# Patient Record
Sex: FEMALE | Race: WHITE | NOT HISPANIC OR LATINO | Employment: STUDENT | URBAN - METROPOLITAN AREA
[De-identification: names, ages, dates, MRNs, and addresses within clinical notes are randomized per-mention and may not be internally consistent; named-entity substitution may affect disease eponyms.]

---
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
## 2024-01-02 DIAGNOSIS — F90.0 ATTENTION DEFICIT HYPERACTIVITY DISORDER (ADHD), PREDOMINANTLY INATTENTIVE TYPE: ICD-10-CM

## 2024-01-02 RX ORDER — DEXMETHYLPHENIDATE HYDROCHLORIDE 10 MG/1
10 CAPSULE, EXTENDED RELEASE ORAL DAILY
Qty: 30 CAPSULE | Refills: 0 | Status: SHIPPED | OUTPATIENT
Start: 2024-01-02

## 2024-01-02 RX ORDER — DEXMETHYLPHENIDATE HYDROCHLORIDE 10 MG/1
10 CAPSULE, EXTENDED RELEASE ORAL DAILY
Qty: 30 CAPSULE | Refills: 0 | Status: CANCELLED | OUTPATIENT
Start: 2024-01-02

## 2024-01-02 NOTE — TELEPHONE ENCOUNTER
Chart and PMDP reviewed. Refill was sent to the patient's preferred pharmacy, covering patient's primary psychiatric provider. The patient has an upcoming appointment with SARI Carlson on 1/12/24.

## 2024-01-02 NOTE — TELEPHONE ENCOUNTER
Medication Refill Request     Name of Medication focalin xr  Dose/Frequency 10mg take 1 capsule by mouth daily  Quantity 30  Verified pharmacy   [x]  Verified ordering Provider   [x]  Does patient have enough for the next 3 days? Yes [x] No []  Does patient have a follow-up appointment scheduled? Yes [x] No []   If so when is appointment: 1/12/2024 9am

## 2024-01-05 ENCOUNTER — TELEPHONE (OUTPATIENT)
Dept: PSYCHIATRY | Facility: CLINIC | Age: 19
End: 2024-01-05

## 2024-01-05 NOTE — TELEPHONE ENCOUNTER
Mother of patient LVM to move patients new patient appointment to a later time in the day at 4 or later due to conflict or reschedule.     Writer called and LVM with office number for mother to call back.

## 2024-01-08 NOTE — TELEPHONE ENCOUNTER
Patient is calling regarding cancelling an new pt appointment.    Date/Time: 1/12/24 at 9 am    Reason: patient had work    Patient was rescheduled: YES [x] NO []  If yes, when was Patient reschedule for: 1/25/24 at 2:30 pm    Patient is calling regarding cancelling an appointment.    Date/Time: 4 week f/u appt rescheduled on 2/7 at 3 pm     Reason: pt r/s new pt appt    Patient was rescheduled: YES [x] NO []  If yes, when was Patient reschedule for: 2/22 at 3:30 pm    Patient requesting call back to reschedule: YES [] NO [x]

## 2024-01-17 DIAGNOSIS — F41.1 GAD (GENERALIZED ANXIETY DISORDER): ICD-10-CM

## 2024-01-19 PROBLEM — R51.9 ACUTE INTRACTABLE HEADACHE: Status: ACTIVE | Noted: 2024-01-19

## 2024-01-19 NOTE — ASSESSMENT & PLAN NOTE
In the setting of URI symptoms. Since headache is most bothersome will trial Toradol IM and follow up if not improved. Treat URI symptomatically otherwise. Follow up if not improved.

## 2024-01-25 ENCOUNTER — OFFICE VISIT (OUTPATIENT)
Dept: PSYCHIATRY | Facility: CLINIC | Age: 19
End: 2024-01-25
Payer: COMMERCIAL

## 2024-01-25 DIAGNOSIS — F90.0 ATTENTION DEFICIT HYPERACTIVITY DISORDER (ADHD), PREDOMINANTLY INATTENTIVE TYPE: Primary | ICD-10-CM

## 2024-01-25 DIAGNOSIS — F41.1 GAD (GENERALIZED ANXIETY DISORDER): ICD-10-CM

## 2024-01-25 PROCEDURE — 90792 PSYCH DIAG EVAL W/MED SRVCS: CPT | Performed by: NURSE PRACTITIONER

## 2024-01-25 RX ORDER — ESCITALOPRAM OXALATE 20 MG/1
20 TABLET ORAL DAILY
Qty: 90 TABLET | Refills: 1 | Status: SHIPPED | OUTPATIENT
Start: 2024-01-25

## 2024-01-25 RX ORDER — ESCITALOPRAM OXALATE 20 MG/1
20 TABLET ORAL DAILY
Qty: 90 TABLET | Refills: 1 | OUTPATIENT
Start: 2024-01-25

## 2024-01-25 RX ORDER — DEXMETHYLPHENIDATE HYDROCHLORIDE 15 MG/1
15 CAPSULE, EXTENDED RELEASE ORAL DAILY
Qty: 30 CAPSULE | Refills: 0 | Status: SHIPPED | OUTPATIENT
Start: 2024-01-25

## 2024-01-25 NOTE — PSYCH
PSYCHIATRIC EVALUATION     [facility] - PSYCHIATRIC ASSOCI[facility]    Name and Date of Birth:  Kathleen Guy 18 y.o. 2005 MRN: [id number]    Date of Visit: January 25, 2024    Reason for visit: Full psychiatric intake assessment for medication management        Chief Complaint   Patient presents with    Medication Management    Establish Care    ADHD    Anxiety       HPI:     Kathleen Guy is a 18 y.o.  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  Kathleen was a former patient of SARI Albert (last visit on 9/8/23) on Lexapro 20 mg daily, Focalin XR 10 mg daily, Intuniv 2 mg at bedtime, Atarax 25 mg twice daily as needed.  Tolerating medication well with no medication side effects observed or reported.  Not actively involved in individual psychotherapy.      Kathleen Guy was visited in the clinic; chart reviewed.  Patient presents to today's appointment as a transfer of care from SARI Otero with an established diagnosis of ADHD, combined type and generalized anxiety disorder.  Patient presents with her mother, Teresa Solomon, who provides collateral information.  During the appointment, Kathleen often looks to her mother for reassurance when answering questions.  She is tangential and has difficulty maintaining focus.  Body is restless, observed fidgeting with hands, changing positions often, and balancing legs up and down.  She becomes more comfortable with the interview with encouragement from this writer as the interview progresses.    The patient reports first meeting with psychiatrist in fifth grade due to symptoms of ADHD and started on  "medications.  She also reports pathologic anxiety starting in childhood which was particularly focused on events occurring in school (difficulty with friendships, teachers, and academics).  Symptoms included worry about \"everything and anything\", irritability, muscle tension, and excessive nervousness.  Reports that panic is symptoms were prevalent prior to the start of Lexapro, but have not occurred since that time.  Currently, she reports ADHD symptoms are interfering with her performance both academically and professionally.  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  While at school, she finds her mind wandering often while in class.  She is unable to recollect whether current dose of Focalin has been effective at alleviating the symptoms as she has not gone any days without the medication.  Reports that dose was not increased during her last session due to concerns of increased anxiety with increased dose.  However, it is apparent that the patient is experiencing significant struggle with ADHD symptoms during time of encounter.  Discussed dose optimization of Focalin to 15 mg daily.  Currently, her appetite is baseline with no change in weight over the last several months.  Energy level is baseline and appropriate.  She denies insomnia.  Typically goes to bed around 10 PM and wakes at 9 AM averaging 11 hours of sleep each night.  Wakes refreshed.  She enjoys painting, large puzzles, photography.  Denies anhedonia, hopelessness, helplessness, worthlessness, or guilt.  No thought constriction related to death.  Denies SI/HI, intent or plan upon direct inquiry at this time.  PHQ-9 score: 11.    Currently endorses occasional and appropriate anxiety that is not pathologic in nature with use of Lexapro. Denies excessive nervousness, irrational worry, or overt anxiousness. Not pervasively tense, \"keyed up\", or chronically on-edge.  No evidence " "that Kathleen experiences irritability, inability to relax, or disruption in sleep secondary to baseline, non-pathologic anxiety. Denies new-onset panic symptomatology or maladaptive behaviors. Throughout today's session, Kathleen does not appear visibly unsettled.  JUDSON-7 score: 6.    She endorses symptoms both acute and chronic consistent with diagnosis of ADHD, combined type.  She often makes careless mistakes and has poor attention, has difficulty sustaining attention, does not listen when spoken to directly, has poor follow-through on tasks, demonstrates organizational challenges, loses important items, is easily distracted, and forgetful in daily activities.  Additionally, she has difficulty staying seated, often appears to be \"driven by a motor\", is restless and fidgety, and at times talks excessively.  These symptoms interfere significantly in academic and professional environments.    Denies history of trauma with no intrusive, avoidance, negative alterations, or hyperarousal symptoms of PTSD noted. Denied any history of disordered eating.  No symptoms of OCD including obsessive, ritualistic acts, intrusive thoughts or images noted. No current or history of manic symptoms. She does not exhibit objective evidence of xander/hypomania.  Not currently irritable, grandiose, labile, or pathologically euphoric.  No objective evidence of swapnil psychosis.  She is without perceptual disturbances, such as A/V hallucinations, paranoia, ideas of reference, or delusional beliefs.  Does not appear preoccupied at time of encounter.  Denies recent ETOH or illicit substance abuse.      Review Of Systems:    Constitutional negative   ENT negative   Cardiovascular negative   Respiratory negative   Gastrointestinal negative   Genitourinary negative   Musculoskeletal negative   Integumentary negative   Neurological negative   Endocrine negative   Other Symptoms none, all other systems are negative         PHQ-2/9 Depression Screening  "   Little interest or pleasure in doing things: 1 - several days  Feeling down, depressed, or hopeless: 1 - several days  Trouble falling or staying asleep, or sleeping too much: 0 - not at all  Feeling tired or having little energy: 2 - more than half the days  Poor appetite or overeatin - several days  Feeling bad about yourself - or that you are a failure or have let yourself or your family down: 1 - several days  Trouble concentrating on things, such as reading the newspaper or watching television: 2 - more than half the days  Moving or speaking so slowly that other people could have noticed. Or the opposite - being so fidgety or restless that you have been moving around a lot more than usual: 3 - nearly every day  Thoughts that you would be better off dead, or of hurting yourself in some way: 0 - not at all  PHQ-9 Score: 11  PHQ-9 Interpretation: Moderate depression         JUDSON-7 Flowsheet Screening      Flowsheet Row Most Recent Value   Over the last 2 weeks, how often have you been bothered by any of the following problems?    Feeling nervous, anxious, or on edge 2   Not being able to stop or control worrying 1   Worrying too much about different things 1   Trouble relaxing 0   Being so restless that it is hard to sit still 0   Becoming easily annoyed or irritable 1   Feeling afraid as if something awful might happen 1   JUDSON-7 Total Score 6              Past Psychiatric History:  Italicized information copied from Leda Gloria's CRNP note 23.  New information bolded.     General Information: denies formal diagnosis or past psychiatric history, denies past psychiatric hospitalizations, denies past suicide attempts, no h/o self-injurious behaviors, no h/o physical aggression     Past Medication Trials: Tenex (ineffective), Adderall (emotional and tearful, appetite suppression and insomnia), Strattera 25 mg (limited benefit, ineffective, more depressed), Concerta up to 36 mg daily (ineffective).   Focalin (effective), Intuniv (effective)     Therapist/Counseling Services: never been in therapy  Past psychiatric providers: Dr. Hamilton, Serena MÉNDEZ, Billie GUO     Family Psychiatric History:   Paternal side - depression and bipolar, anxiety     FH of Suicide - paternal cousin    Substance Abuse History:     Tobacco/alcohol/caffeine: Denies alcohol use, Denies tobacco use, Denies caffeine use  Illicit drugs: Denies history of illicit drug use    No past legal actions or arrests secondary to substance intoxication. The patient denies prior DWIs/DUIs. No history of outpatient/inpatient rehabilitation programs. Kathleen does not exhibit objective evidence of substance withdrawal during today's examination nor does Kathleen appear under the influence of any psychoactive substance.        Social History:      Developmental: Denies a history of milestone/developmental delay. Denies a history of in-utero exposure to toxins/illicit substances. IEP since 1st grade.  Education: some college, PT student at [employer]  Marital history: single  Children: none  Living arrangement, social support: parents  Occupational History: employed at [employer] as [employer]  Access to firearms: Denies direct access to weapons/firearms. Kathleen Guy has no history of arrests or violence with a deadly weapon.     Traumatic History:      Abuse: none  Other Traumatic Events: none     Family Psychiatric History:     Family History   Problem Relation Age of Onset    No Known Problems Mother     No Known Problems Father     Cancer Maternal Grandmother     Cancer Maternal Grandfather     Diabetes Maternal Grandfather     Hypertension Maternal Grandfather     Thyroid disease Maternal Grandfather     Other Maternal Grandfather         Thyroid Disorder    Dementia Maternal Grandfather     Hypertension Paternal Grandfather     Heart disease Paternal Grandfather     Osteoporosis Paternal Grandfather     Stroke Paternal Grandfather     Coronary artery  disease Paternal Grandfather     Migraines Neg Hx     Learning disabilities Neg Hx          Past Medical History:    Past Medical History:   Diagnosis Date    CASTAÑEDA (dyspnea on exertion)     Psychiatric disorder     Tinea versicolor         Past Surgical History:   Procedure Laterality Date    DENTAL SURGERY  2017    several baby teeth pulled     No Known Allergies    History Review:    The following portions of the patient's history were reviewed and updated as appropriate:allergies, current medications, past family history, past medical history, past social history, past surgical history and problem list.    OBJECTIVE:    Vital signs in last 24 hours:    There were no vitals filed for this visit.    Mental Status Evaluation:  Appearance and attitude: appeared as stated age, cooperative and attentive, casually dressed, with good hygiene  Eye contact: fair  Motor Function: PMA  Gait/station: normal gait/station and normal balance  Speech: hyper-talkative  Language: No overt abnormality  Mood/affect: euthymic / Affect was constricted but reactive, mood congruent  Thought Processes: tangential, increased rate of thoughts  Thought content: denies suicidal ideation or homicidal ideation; no delusions or first rank symptoms  Associations: tangential associations  Perceptual disturbances: denies Auditory/Visual/Tactile Hallucinations  Orientation: oriented to time, person, place and to the situational context  Cognitive Function: intact  Memory: recent and remote memory grossly intact  Intellect: h/o learning disability  Fund of knowledge: aware of current events, aware of past history, and vocabulary average  Impulse control: impulsive at times  Insight/judgment: fair/fair    Pain: denied    Lab Results: I have personally reviewed all pertinent laboratory/tests results  Most Recent Labs:   Lab Results   Component Value Date    WBC 5.37 10/28/2022    RBC 4.74 10/28/2022    HGB 14.4 10/28/2022    HCT 41.7 10/28/2022    PLT  276 10/28/2022    RDW 11.7 10/28/2022    NEUTROABS 2.47 10/28/2022    SODIUM 137 10/28/2022    K 3.6 10/28/2022     10/28/2022    CO2 26 10/28/2022    BUN 15 10/28/2022    CREATININE 0.83 10/28/2022    CALCIUM 10.0 10/28/2022    AST 17 10/28/2022    ALT 24 10/28/2022    ALKPHOS 72 10/28/2022    TP 8.4 10/28/2022    TBILI 0.31 10/28/2022     EKG   Lab Results   Component Value Date    VENTRATE 87 10/28/2022    ATRIALRATE 87 10/28/2022    PRINT 188 10/28/2022    QRSDINT 90 10/28/2022    QTINT 376 10/28/2022    PAXIS 70 10/28/2022    QRSAXIS 83 10/28/2022    TWAVEAXIS 72 10/28/2022       Suicide/Homicide Risk Assessment:    Risk of Harm to Self:  The following ratings are based on assessment at the time of the interview  Demographic risk factors include: , age: young adult (15-24)  Historical Risk Factors include: history of anxiety  Recent Specific Risk Factors include: none  Protective Factors: no current suicidal ideation, ability to adapt to change, able to manage anger well, access to mental health treatment, compliant with medications, compliant with mental health treatment, connection to community, contact with caregivers, cultural beliefs discouraging suicide, effective coping skills, effective decision-making skills, effective problem solving skills, good health, good self-esteem, having a desire to be alive, having a desire to live, having a sense of purpose or meaning in life, healthy fear of risky behaviors and pain, medical compliance, no substance use problems, opportunities to contribute to community, opportunities to participate in community, personal beliefs, personal beliefs about the meaning and value of life, resiliency, restricted access to lethal means, stable living environment, stable job, sense of determination, sense of importance of health and wellness, sense of personal control, strong relationships, supportive family  Based on today's assessment, Kathleen presents the following  risk of harm to self: low    Risk of Harm to Others:  The following ratings are based on assessment at the time of the interview  Demographic Risk Factors include: none.  Historical Risk Factors include: none.  Recent Specific Risk Factors include: none.  Protective Factors: no current homicidal ideation  Based on today's assessment, Kathleen presents the following risk of harm to others: none    The following interventions are recommended: no intervention changes needed. Although patient's acute lethality risk is LOW, long-term/chronic lethality risk is mildly elevated given history of anxiety. However, at the current moment, Kathleen is future-oriented, forward-thinking, and demonstrates ability to act in a self-preserving manner as evidenced by volitionally presenting to the clinic today, seeking treatment.  At this time, inpatient hospitalization is not currently warranted. To mitigate future risk, patient should adhere to treatment recommendations, avoid alcohol/illicit substance use, utilize community-based resources and familiar support, and prioritize mental health treatment.     Based on today's assessment and clinical criteria, Kathleen Guy contracts for safety and is not an imminent risk of harm to self or others. Outpatient level of care is deemed appropriate at this present time. Kathleen understands that if they are no longer able to contract for safety, they need to call/contact the outpatient office including this writer, call/contact crisis and/or attend to the nearest Emergency Department for immediate evaluation.    Assessment/Plan:     In summary, Kathleen Guy is a 18 y.o.  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  Kathleen was a former patient of SARI Albert (last visit on 9/8/23) on Lexapro 20 mg daily, Focalin XR 10 mg daily, Intuniv 2 mg at bedtime, Atarax 25 mg twice daily as needed.  Tolerating medication well with no medication side effects observed or reported.  Not actively involved in individual psychotherapy.  The patient reports first meeting with psychiatrist in fifth grade due to symptoms of ADHD and started on medications.  She also reports pathologic anxiety starting in childhood which was particularly focused on events occurring in school (difficulty with friendships, teachers, and academics).  Reports that panic is symptoms were prevalent prior to the start of Lexapro, but have not occurred since that time.  Currently, she reports ADHD symptoms are interfering with her performance both academically and professionally.  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  While at school, she finds her mind wandering often while in class.  She is unable to recollect whether current dose of Focalin has been effective at alleviating the symptoms as she has not gone any days without the medication.  Reports that dose was not increased during her last session due to concerns of increased anxiety with increased dose.  However, it is apparent that the patient is experiencing significant struggle with ADHD symptoms during time of encounter.  Discussed dose optimization of Focalin to 15 mg daily.  Currently, her appetite is baseline with no change in weight over the last several months.  Energy level is baseline and appropriate.  She denies insomnia.  Typically goes to bed around 10 PM and wakes at 9 AM averaging 11 hours of sleep each night.  Wakes refreshed.  She enjoys painting, large puzzles, photography.  Denies anhedonia, hopelessness, helplessness, worthlessness, or guilt.  No thought  constriction related to death.  Denies SI/HI, intent or plan upon direct inquiry at this time.  PHQ-9 score: 11; JUDSON-7 score: 6.  Her current presentation meets criteria for ADHD, combined type, JUDSON.  Currently she is not at risk for suicide, homicide, self-injury, aggressive behaviors, self-neglect, or neglect of dependents or children. Given this presentation, the patient will benefit from further outpatient follow up for management of her symptoms.     Psychopharmacologically, patient endorses mood stability on current medication regimen.  However, ADHD symptoms remain problematic both with her academics and employment.  In the past, dose optimization was not done secondary to fear of increased anxiety.  However patient's anxiety is well-controlled at this time.  Will optimize Focalin to 15 mg daily.         Plan:  Increase Focalin to 15 mg daily for ADHD  Continue Lexapro 20 mg daily for anxiety  Psychotherapy-declines at this time  Follow up with primary care provider for ongoing medical care  Follow up with this provider in 1 month         Diagnoses and all orders for this visit:    Attention deficit hyperactivity disorder (ADHD), predominantly inattentive type  -     dexmethylphenidate (Focalin XR) 15 MG 24 hr capsule; Take 1 capsule (15 mg total) by mouth daily Max Daily Amount: 15 mg    JUDSON (generalized anxiety disorder)  -     escitalopram (LEXAPRO) 20 mg tablet; Take 1 tablet (20 mg total) by mouth daily            - Psychoeducation provided regarding the importance of exercise and health dietary choices and their impact on mood, energy, and motivation.  - Counseled to avoid ETOH, illicit substances, and nicotine secondary to the detrimental effects of these substances on mental and physical health.  - Psychoeducation regarding medication benefits and risks, side effects, indications and alternatives provided to the patient and the importance of compliance with psychiatric medication reiterated. The  Kathleenhannah Guy verbalized understanding and agreed with the plan  - Educated on the Bio-Psycho-Social Spiritual and Environmental Approach to mental health.  - The patient was educated about 24 hour and weekend coverage for urgent situations accessed by calling [facility] main practice number  - Patient was educated to call [employer] Suicide Prevention Lifeline (2-663-834-TALK [9896]) for behavioral crisis at any time, 911 for any safety concerns, or go to nearest ER if her symptoms become overwhelming or unmanageable.    Medications Risks/Benefits:      Risks, Benefits And Possible Side Effects Of Medications:    Risks, benefits, and possible side effects of medications explained to Kathleen including risk of suicidality and serotonin syndrome related to treatment with antidepressants and risks of cardiovascular side effects including elevated blood pressure, risk of misuse, abuse or dependence and risk of increased anxiety related to treatment with stimulant medications. She verbalizes understanding and agreement for treatment.    Controlled Medication Discussion:     Kathleen has been filling controlled prescriptions on time as prescribed according to [place] Prescription Drug Monitoring Program    Treatment Plan:    Completed and signed during the session: Not applicable - Treatment Plan not due at this session    Visit Time    Visit Start Time: 2:30 PM  Visit Stop Time: 3:30 PM  Total Visit Duration:  60 minutes    SARI Carlson 01/25/24    This note was completed in part utilizing [employer]. Grammatical, translation, syntax errors, random word insertions, spelling mistakes, and incomplete sentences may be an occasional consequence of this system secondary to software limitations with voice recognition, ambient noise, and hardware issues. If you have any questions or concerns about the content, text, or information contained within the body of this dictation,  please contact the provider for clarification.

## 2024-01-30 ENCOUNTER — OFFICE VISIT (OUTPATIENT)
Dept: FAMILY MEDICINE CLINIC | Facility: CLINIC | Age: 19
End: 2024-01-30
Payer: COMMERCIAL

## 2024-01-30 VITALS
HEIGHT: 68 IN | DIASTOLIC BLOOD PRESSURE: 60 MMHG | HEART RATE: 76 BPM | RESPIRATION RATE: 16 BRPM | BODY MASS INDEX: 20.61 KG/M2 | OXYGEN SATURATION: 98 % | SYSTOLIC BLOOD PRESSURE: 98 MMHG | TEMPERATURE: 98 F | WEIGHT: 136 LBS

## 2024-01-30 DIAGNOSIS — R10.13 EPIGASTRIC PAIN: Primary | ICD-10-CM

## 2024-01-30 PROCEDURE — 99213 OFFICE O/P EST LOW 20 MIN: CPT | Performed by: FAMILY MEDICINE

## 2024-01-30 NOTE — ASSESSMENT & PLAN NOTE
Recurrent epigastric pain, worse recently. Could be due to viral etiology vs reflux. Will keep food and symptom diary. Can treat with antacids and Pepcid prn. Follow up if persistent or worsening.

## 2024-01-30 NOTE — PROGRESS NOTES
"Name: aKthleen Guy      : 2005      MRN: [id number]  Encounter Provider: Arabella Aguilar DO  Encounter Date: 2024   Encounter department: [facility]    Assessment & Plan     1. Epigastric pain  Assessment & Plan:  Recurrent epigastric pain, worse recently. Could be due to viral etiology vs reflux. Will keep food and symptom diary. Can treat with antacids and Pepcid prn. Follow up if persistent or worsening.             Subjective      HPI  Reflux symptoms for months, feels like it is worsening recently. Tried antacids which did not improve her symptoms, though the severe symptoms have resolved.     Review of Systems    Current Outpatient Medications on File Prior to Visit   Medication Sig   • dexmethylphenidate (Focalin XR) 15 MG 24 hr capsule Take 1 capsule (15 mg total) by mouth daily Max Daily Amount: 15 mg   • escitalopram (LEXAPRO) 20 mg tablet Take 1 tablet (20 mg total) by mouth daily   • guanFACINE HCl ER (INTUNIV) 2 MG TB24 Take 1 tablet (2 mg total) by mouth daily at bedtime   • hydrOXYzine HCL (ATARAX) 25 mg tablet Take 1 tablet (25 mg total) by mouth 2 (two) times a day as needed for anxiety   • Multiple Vitamin (MULTI-VITAMIN DAILY PO) Take by mouth   • Sodium Fluoride 5000 PPM 1.1 % PSTE Use as directed   • benzonatate (TESSALON PERLES) 100 mg capsule Take 1 capsule (100 mg total) by mouth 3 (three) times a day as needed for cough (Patient not taking: Reported on 2024)   • SF 5000 Plus 1.1 % CREA Use as directed (Patient not taking: Reported on 2023)       Objective     BP 98/60 (BP Location: Left arm, Patient Position: Sitting, Cuff Size: Standard)   Pulse 76   Temp 98 °F (36.7 °C) (Temporal)   Resp 16   Ht 5' 8\" (1.727 m)   Wt 61.7 kg (136 lb)   SpO2 98%   BMI 20.68 kg/m²     Physical Exam  Vitals reviewed.   Constitutional:       Appearance: Normal appearance.   Cardiovascular:      Rate and Rhythm: Normal rate.   Pulmonary:      Effort: Pulmonary " effort is normal.   Abdominal:      General: Abdomen is flat. There is no distension.      Palpations: Abdomen is soft.      Tenderness: There is no abdominal tenderness.   Skin:     General: Skin is warm and dry.   Neurological:      Mental Status: She is alert.   Psychiatric:         Mood and Affect: Mood normal.         Behavior: Behavior normal.       Arabella Aguilar DO

## 2024-02-09 ENCOUNTER — TELEPHONE (OUTPATIENT)
Dept: PSYCHIATRY | Facility: CLINIC | Age: 19
End: 2024-02-09

## 2024-02-09 DIAGNOSIS — F41.1 GAD (GENERALIZED ANXIETY DISORDER): ICD-10-CM

## 2024-02-09 DIAGNOSIS — F90.0 ATTENTION DEFICIT HYPERACTIVITY DISORDER (ADHD), PREDOMINANTLY INATTENTIVE TYPE: ICD-10-CM

## 2024-02-09 NOTE — TELEPHONE ENCOUNTER
Faxed request from pharmacy, [facility], for a 90 day refill of escitalopram 20 mg and guanfacine ER 2 mg tabs    Next appointment 2/22/24

## 2024-02-12 RX ORDER — ESCITALOPRAM OXALATE 20 MG/1
20 TABLET ORAL DAILY
Qty: 90 TABLET | Refills: 1 | Status: SHIPPED | OUTPATIENT
Start: 2024-02-12

## 2024-02-12 RX ORDER — GUANFACINE 2 MG/1
2 TABLET, EXTENDED RELEASE ORAL
Qty: 90 TABLET | Refills: 1 | Status: SHIPPED | OUTPATIENT
Start: 2024-02-12

## 2024-02-12 NOTE — TELEPHONE ENCOUNTER
Dixon for Kathleen- Received a faxed refill request for 2 of her medications to be sent to  [facility]. Calling to verify if would like medications sent to [facility]. Encouraged a call back. Provided nursing number

## 2024-02-12 NOTE — TELEPHONE ENCOUNTER
Patients parent called and returned call to confirm on patents behalf medications to be sent to [facility]    Medication Refill Request     Name of Medication guanfacine 2 mg tablet  Dose/Frequency 2 mg tablet/take 1 tablet by mouth daily  Quantity 2 mg tablet  Verified pharmacy   [x]  Verified ordering Provider   [x]  Does patient have enough for the next 3 days? Yes [x] No []  Does patient have a follow-up appointment scheduled? Yes [x] No []   If so when is appointment: 2/22    Medication Refill Request     Name of Medication lexapro 20 mg tablet  Dose/Frequency 20 mg tablet/take 1 daily  Quantity 20 mg tablet  Verified pharmacy   [x]  Verified ordering Provider   [x]  Does patient have enough for the next 3 days? Yes [x] No []  Does patient have a follow-up appointment scheduled? Yes [x] No []   If so when is appointment: 2/22

## 2024-02-21 NOTE — PSYCH
MEDICATION MANAGEMENT NOTE        [facility]      Name and Date of Birth:  Kathleen Guy 18 y.o. 2005 MRN: [id number]    Date of Visit: February 22, 2024    Reason for Visit:   Chief Complaint   Patient presents with    Medication Management    Follow-up    Anxiety    ADHD         SUBJECTIVE:    Kathleen Guy is a 18 y.o. female with past psychiatric history significant for Generalized Anxiety Disorder and ADHD who was personally seen and evaluated today at the [facility] outpatient clinic for follow-up and medication management. Completes psychiatric assessment without difficulty.     Kathleen endorses compliance with psychotropic medication regimen that consists of Lexapro and Focalin.  At previous outpatient psychiatric appointment with this writer, Focalin increased to 15 mg daily.   She denies any current adverse medication side effects.      Overall, Kathleen  endorses improvement in ability to direct attention and focus with academics but continues to struggle at work as commotion is ever present. No issues with discipline at work.  Continues to enjoy her job. Discussed strategies to manage ADHD symptoms as medications may not alleviate all symptoms.  Sleeping well.  Energy good. Not depressed. Having issues with heart burn.  Has an upcoming appointment GI for evaluation.  Stomach issues have contributed to anxiety.  Also appears to have difficulty in social situations at work in which she is uncertain of how she is being viewed or evaluate.  Discussed cognitive distortions and the role of individual psychotherapy in overall mental health stability. Patient was receptive to information and agreeable to be placed on therapy wait list.  No other questions/concerns.       Current Rating Scores:     None completed today.    Past Psychiatric History: (unchanged information from previous note copied and italicized) - Information that is  bolded has been updated.     General Information: denies formal diagnosis or past psychiatric history, denies past psychiatric hospitalizations, denies past suicide attempts, no h/o self-injurious behaviors, no h/o physical aggression     Past Medication Trials: Tenex (ineffective), Adderall (emotional and tearful, appetite suppression and insomnia), Strattera 25 mg (limited benefit, ineffective, more depressed), Concerta up to 36 mg daily (ineffective).  Focalin (effective), Intuniv (effective)     Therapist/Counseling Services: never been in therapy  Past psychiatric providers: Dr. Hamilton, Serena Jordan PA, Billie GUO     Family Psychiatric History:   Paternal side - depression and bipolar, anxiety     FH of Suicide - paternal cousin    Substance Abuse History: (unchanged information from previous note copied and italicized) - Information that is bolded has been updated.     Tobacco/alcohol/caffeine: Denies alcohol use, Denies tobacco use, Denies caffeine use  Illicit drugs: Denies history of illicit drug use     No past legal actions or arrests secondary to substance intoxication. The patient denies prior DWIs/DUIs. No history of outpatient/inpatient rehabilitation programs. Kathleen does not exhibit objective evidence of substance withdrawal during today's examination nor does Kathleen appear under the influence of any psychoactive substance.      Social History: (unchanged information from previous note copied and italicized) - Information that is bolded has been updated.     Developmental: Denies a history of milestone/developmental delay. Denies a history of in-utero exposure to toxins/illicit substances. IEP since 1st grade.  Education: some college, PT student at 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 Kathleen Guy has no history of  arrests or violence with a deadly weapon.     Traumatic History: (unchanged information from previous note copied and italicized) - Information that is bolded has been updated.     Abuse: none  Other Traumatic Events: none       Past Medical History:    Past Medical History:   Diagnosis Date    CASTAÑEDA (dyspnea on exertion)     Psychiatric disorder     Tinea versicolor         Past Surgical History:   Procedure Laterality Date    DENTAL SURGERY  2017    several baby teeth pulled    WISDOM TOOTH EXTRACTION       No Known Allergies    Substance Abuse History:    Social History     Substance and Sexual Activity   Alcohol Use No     Social History     Substance and Sexual Activity   Drug Use No       Social History:    Social History     Socioeconomic History    Marital status: Single     Spouse name: Not on file    Number of children: Not on file    Years of education: Not on file    Highest education level: Not on file   Occupational History    Not on file   Tobacco Use    Smoking status: Never    Smokeless tobacco: Never   Vaping Use    Vaping status: Never Used   Substance and Sexual Activity    Alcohol use: No    Drug use: No    Sexual activity: Not on file   Other Topics Concern    Not on file   Social History Narrative    Lives with Mom & Dad- no siblings    Graduated [employer].        Finished 1 year at [employer].    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        Pets/Animals: yes birds 4    /After School Program:yes 12 th grade In person    Carbon Monoxide/Smoke detectors in home: yes    Fire Place: no    Exposure to Mold: yes basement is not finished    Carpet in Home: yes    Stuffed Animals (Toys): yes sleeps with stuffed animals     Tobacco Use: Exposure to smoke no    E-Cigarette/Vaping: Exposure to E-Cigarette/Vaping no             Social Determinants of Health     Financial Resource Strain: Not on file   Food Insecurity: Not on file   Transportation Needs: Not on file   Physical  Activity: Not on file   Stress: Not on file   Social Connections: Not on file   Intimate Partner Violence: Not on file   Housing Stability: Not on file       Family Psychiatric History:     Family History   Problem Relation Age of Onset    No Known Problems Mother     No Known Problems Father     Cancer Maternal Grandmother     Cancer Maternal Grandfather     Diabetes Maternal Grandfather     Hypertension Maternal Grandfather     Thyroid disease Maternal Grandfather     Other Maternal Grandfather         Thyroid Disorder    Dementia Maternal Grandfather     Hypertension Paternal Grandfather     Heart disease Paternal Grandfather     Osteoporosis Paternal Grandfather     Stroke Paternal Grandfather     Coronary artery disease Paternal Grandfather     Migraines Neg Hx     Learning disabilities Neg Hx        History Review: The following portions of the patient's history were reviewed and updated as appropriate: allergies, current medications, past medical history, and past social history.         OBJECTIVE:     Vital signs in last 24 hours:    There were no vitals filed for this visit.    Mental Status Evaluation:    Appearance age appropriate, casually dressed   Behavior cooperative, fair eye contact, restless and fidgety   Speech normal rate, normal volume, normal pitch   Mood anxious   Affect constricted   Thought Processes organized, goal directed   Associations intact associations   Thought Content no overt delusions   Perceptual Disturbances: no auditory hallucinations, no visual hallucinations   Abnormal Thoughts  Risk Potential Suicidal ideation - None  Homicidal ideation - None  Potential for aggression - No   Orientation oriented to: person, place, time/date, and situation   Memory recent and remote memory grossly intact   Consciousness alert and awake   Attention Span Concentration Span attention span and concentration are age appropriate   Intellect appears to be of average intelligence   Insight intact    Judgement intact   Muscle Strength and  Gait normal muscle strength and normal muscle tone, normal gait and normal balance   Motor activity no abnormal movements   Language no difficulty naming common objects, no difficulty repeating a phrase   Fund of Knowledge adequate knowledge of current events  adequate fund of knowledge regarding past history  adequate fund of knowledge regarding vocabulary    Pain none   Pain Scale 0       Laboratory Results: I have personally reviewed all pertinent laboratory/tests results    Lethality Statement:    Based on today's assessment and clinical criteria, Kathleen Guy contracts for safety and is not an imminent risk of harm to self or others. Outpatient level of care is deemed appropriate at this current time. Kathleen understands that if they can no longer contract for safety, they need to call the office or report to their nearest Emergency Room for immediate evaluation.    Assessment/Plan:     In summary, Kathleen Guy is a 18 y.o.  female, Single (never ), domiciled with parents, currently employed ([employer], bagger) and PT student at [employer], w/ no significant PMH and PPH of JUDSON, ADHD, no prior psychiatric admissions, no prior SA, no h/o self-injurious behavior,  who presented to the mental health clinic for the initial intake and psychiatric evaluation on January 25, 2024.  Kathleen was a former patient of SARI Albert (last visit on 9/8/23) on Lexapro 20 mg daily, Focalin XR 10 mg daily, Intuniv 2 mg at bedtime, Atarax 25 mg twice daily as needed.  Tolerating medication well with no medication side effects observed or reported.  Not actively involved in individual psychotherapy.  The patient reports first meeting with psychiatrist in fifth grade due to symptoms of ADHD and started on medications.  She also reports pathologic anxiety starting in childhood which was particularly focused on events occurring in school (difficulty with friendships,  teachers, and academics).  Reports that panic is symptoms were prevalent prior to the start of Lexapro, but have not occurred since that time.  Currently, she reports ADHD symptoms are interfering with her performance both academically and professionally.  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  While at school, she finds her mind wandering often while in class.  She is unable to recollect whether current dose of Focalin has been effective at alleviating the symptoms as she has not gone any days without the medication.  Reports that dose was not increased during her last session due to concerns of increased anxiety with increased dose.  However, it is apparent that the patient is experiencing significant struggle with ADHD symptoms during time of encounter.  Discussed dose optimization of Focalin to 15 mg daily.  Currently, her appetite is baseline with no change in weight over the last several months.  Energy level is baseline and appropriate.  She denies insomnia.  Typically goes to bed around 10 PM and wakes at 9 AM averaging 11 hours of sleep each night.  Wakes refreshed.  She enjoys painting, large puzzles, photography.  Denies anhedonia, hopelessness, helplessness, worthlessness, or guilt.  No thought constriction related to death.  Denies SI/HI, intent or plan upon direct inquiry at this time.  PHQ-9 score: 11; JUDSON-7 score: 6.  Her current presentation meets criteria for ADHD, combined type, JUDSON.  Focalin increased to 15 mg daily secondary to problematic ADHD symptoms affecting both employment and academics.    Psychopharmacologically, Kathleen endorses an improvement in ADHD symptoms with increased dose of Focalin.  She is experiencing heightened anxiety due to GI issues in addition to social situations.  Will place on therapy wait list. No medication changes. Will consider Buspar at next session if appropriate.      Risks/benefits/alternativies to treatment discussed, including a myriad of potential adverse medication side effects, to which Kathleen voiced understanding and consented fully to treatment.  Also, patient is amenable to calling/contacting the outpatient office including this writer if any acute adverse effects of their medication regimen arise in addition to any comments or concerns pertaining to their psychiatric management.         Plan:  Continue Focalin to 15 mg daily for ADHD  Continue Lexapro 20 mg daily for anxiety  Psychotherapy- placed on therapy wait list.   Follow up with primary care provider for ongoing medical care  Follow up with this provider in 3 months     Diagnoses and all orders for this visit:    Social anxiety disorder    Attention deficit hyperactivity disorder (ADHD), predominantly inattentive type           - Psychoeducation provided regarding the importance of exercise and health dietary choices and their impact on mood, energy, and motivation.  - Counseled to avoid ETOH, illicit substances, and nicotine secondary to the detrimental effects of these substances on mental and physical health.  - Encouraged to engage in non-verbal forms of therapy such as art therapy, music therapy, and mindfulness.   Aware of 24 hour and weekend coverage for urgent situations accessed by calling [facility] main practice number    Medications Risks/Benefits      Risks, Benefits And Possible Side Effects Of Medications:    Risks, benefits, and possible side effects of medications explained to Kathleen including risk of suicidality and serotonin syndrome related to treatment with antidepressants and risks of cardiovascular side effects including elevated blood pressure, risk of misuse, abuse or dependence and risk of increased anxiety related to treatment with stimulant medications. She verbalizes understanding and agreement for treatment.    Controlled Medication Discussion:     Kathleen  has been filling controlled prescriptions on time as prescribed according to [place] Prescription Drug Monitoring Program    Psychotherapy Provided:     Individual psychotherapy provided: Yes  Counseling was provided during the session today for 16 minutes  Medication education provided to Kathleen  Goals discussed during session  Cognitive therapy was utilized during the session  Reassurance and supportive therapy provided     Treatment Plan:    Completed and signed during the session: Not applicable - Treatment Plan not due at this session    Visit Time    Visit Start Time: 3:40 PM  Visit Stop Time: 4:20 PM  Total Visit Duration:  40 minutes    SARI Carlson 02/22/24    This note was completed in part utilizing [employer]. Grammatical, translation, syntax errors, random word insertions, spelling mistakes, and incomplete sentences may be an occasional consequence of this system secondary to software limitations with voice recognition, ambient noise, and hardware issues. If you have any questions or concerns about the content, text, or information contained within the body of this dictation, please contact the provider for clarification.

## 2024-02-22 ENCOUNTER — TELEPHONE (OUTPATIENT)
Dept: PSYCHIATRY | Facility: CLINIC | Age: 19
End: 2024-02-22

## 2024-02-22 ENCOUNTER — OFFICE VISIT (OUTPATIENT)
Dept: PSYCHIATRY | Facility: CLINIC | Age: 19
End: 2024-02-22

## 2024-02-22 DIAGNOSIS — F40.10 SOCIAL ANXIETY DISORDER: Primary | Chronic | ICD-10-CM

## 2024-02-22 DIAGNOSIS — F90.0 ATTENTION DEFICIT HYPERACTIVITY DISORDER (ADHD), PREDOMINANTLY INATTENTIVE TYPE: ICD-10-CM

## 2024-02-22 NOTE — TELEPHONE ENCOUNTER
Patient called as pt will be a few mins late and to let provider know. Writer sent a message to provider so she is aware.

## 2024-02-23 ENCOUNTER — TELEPHONE (OUTPATIENT)
Dept: GASTROENTEROLOGY | Facility: CLINIC | Age: 19
End: 2024-02-23

## 2024-02-23 ENCOUNTER — CONSULT (OUTPATIENT)
Dept: GASTROENTEROLOGY | Facility: CLINIC | Age: 19
End: 2024-02-23
Payer: COMMERCIAL

## 2024-02-23 VITALS
HEIGHT: 68 IN | TEMPERATURE: 98.5 F | DIASTOLIC BLOOD PRESSURE: 70 MMHG | WEIGHT: 134 LBS | BODY MASS INDEX: 20.31 KG/M2 | SYSTOLIC BLOOD PRESSURE: 106 MMHG

## 2024-02-23 DIAGNOSIS — K59.09 CHRONIC CONSTIPATION: Primary | ICD-10-CM

## 2024-02-23 DIAGNOSIS — K21.9 GASTROESOPHAGEAL REFLUX DISEASE WITHOUT ESOPHAGITIS: ICD-10-CM

## 2024-02-23 DIAGNOSIS — R10.13 EPIGASTRIC PAIN: ICD-10-CM

## 2024-02-23 PROCEDURE — 99204 OFFICE O/P NEW MOD 45 MIN: CPT | Performed by: INTERNAL MEDICINE

## 2024-02-23 RX ORDER — POLYETHYLENE GLYCOL 3350 17 G/17G
17 POWDER, FOR SOLUTION ORAL DAILY
Qty: 510 G | Refills: 5 | Status: SHIPPED | OUTPATIENT
Start: 2024-02-23 | End: 2024-08-21

## 2024-02-23 NOTE — PROGRESS NOTES
[facility] - Outpatient Consultation  Kathleen Guy 18 y.o. female MRN: [id number]  Encounter: [id number]          ASSESSMENT AND PLAN:      1. Chronic constipation  2.  GERD  3.  Abdominal pain    Patient symptoms worsening in the last few weeks.  Differentials include upper GI symptoms uncontrolled due to uncontrolled constipation, peptic ulcer disease, H. pylori, gastritis.  At this time we will start off by treating constipation.  Discussed with patient about starting MiraLAX once daily.  Increase hydration to 8 to 10 cups of water per day.  The goal of therapy is to have 1-2 soft loose bowel movements per day with feeling of complete evacuation.  She can increase the dose of MiraLAX up to 3 times a day if needed to achieve the goal of bowel movements.  Which ever dose of MiraLAX helps achieve the goal of bowel movements she can take that on a daily basis.  Discussed with patient about getting EGD done to evaluate for other causes.  Patient agreeable.  Procedure ordered.  Continue Pepcid for now.    RTC 4 months.    Kalpesh Hennessy MD  Gastroenterology  [facility]  Date: February 23, 2024    - polyethylene glycol (MIRALAX) 17 g packet; Take 17 g by mouth daily  Dispense: 510 g; Refill: 5  - EGD; Future    ______________________________________________________________________    HPI: 18-year-old with social anxiety disorder presents for evaluation.    Patient was seen by ENT earlier this month patient had laryngoscopy done during the visit.  The records from the visit were reviewed by me today.  There was evidence of mild laryngeal pharyngeal reflux during the laryngoscopy.  Patient was started on omeprazole 20 mg daily.    Patient reports epigastric abdominal pain for the last 3 to 4 weeks.  This is increasing in severity and frequency.  She also has heartburn for the last few weeks usually occurs after eating.  She tried Pepcid which has not completely  helped symptoms.  She started omeprazole after the ENT visit but only could take it for 2 days as it worsened her abdominal pain and then she stopped it.  Patient reports hard bowel movements for the last few months.  She notes that as [place] stool scale type II.  No blood in stools.  Currently does not take any stool softeners.  She does take fiber Gummies.  She has noted weight gain in the last 1 year.  Her grandmother had colon cancer.  She reports Advil intake.  No alcohol or marijuana intake.  No cigarette smoking.    Patient had labs done in October 2022 which were reviewed by me today and showed normal renal function, liver enzymes and blood counts.  TSH done in 2020 was normal.  No abdominal imaging was available to review for me in patient records.      REVIEW OF SYSTEMS:    CONSTITUTIONAL: Denies any fever, chills, rigors, and weight loss.  HEENT: No earache or tinnitus. Denies hearing loss or visual disturbances.  CARDIOVASCULAR: No chest pain or palpitations.   RESPIRATORY: Denies any cough, hemoptysis, shortness of breath or dyspnea on exertion.  GASTROINTESTINAL: As noted in the History of Present Illness.   GENITOURINARY: No problems with urination. Denies any hematuria or dysuria.  NEUROLOGIC: No dizziness or vertigo, denies headaches.   MUSCULOSKELETAL: Denies any muscle or joint pain.   SKIN: Denies skin rashes or itching.   ENDOCRINE: Denies excessive thirst. Denies intolerance to heat or cold.  PSYCHOSOCIAL: Denies depression or anxiety. Denies any recent memory loss.       Historical Information   Past Medical History:   Diagnosis Date    CASTAÑEDA (dyspnea on exertion)     Psychiatric disorder     Tinea versicolor      Past Surgical History:   Procedure Laterality Date    DENTAL SURGERY  2017    several baby teeth pulled    WISDOM TOOTH EXTRACTION       Social History   Social History     Substance and Sexual Activity   Alcohol Use No     Social History     Substance and Sexual Activity   Drug Use  "No     Social History     Tobacco Use   Smoking Status Never   Smokeless Tobacco Never     Family History   Problem Relation Age of Onset    No Known Problems Mother     No Known Problems Father     Cancer Maternal Grandmother     Colon cancer Maternal Grandmother     Cancer Maternal Grandfather     Diabetes Maternal Grandfather     Hypertension Maternal Grandfather     Thyroid disease Maternal Grandfather     Other Maternal Grandfather         Thyroid Disorder    Dementia Maternal Grandfather     Hypertension Paternal Grandfather     Heart disease Paternal Grandfather     Osteoporosis Paternal Grandfather     Stroke Paternal Grandfather     Coronary artery disease Paternal Grandfather     Migraines Neg Hx     Learning disabilities Neg Hx        Meds/Allergies       Current Outpatient Medications:     dexmethylphenidate (Focalin XR) 15 MG 24 hr capsule    escitalopram (LEXAPRO) 20 mg tablet    guanFACINE HCl ER (INTUNIV) 2 MG TB24    hydrOXYzine HCL (ATARAX) 25 mg tablet    Multiple Vitamin (MULTI-VITAMIN DAILY PO)    polyethylene glycol (MIRALAX) 17 g packet    Sodium Fluoride 5000 PPM 1.1 % PSTE    benzonatate (TESSALON PERLES) 100 mg capsule    No Known Allergies        Objective     Blood pressure 106/70, temperature 98.5 °F (36.9 °C), height 5' 8\" (1.727 m), weight 60.8 kg (134 lb). Body mass index is 20.37 kg/m².        PHYSICAL EXAM:      General Appearance:   Alert, cooperative, no distress   HEENT:   Normocephalic, atraumatic, anicteric.     Neck:  Supple, symmetrical, trachea midline   Lungs:   Clear to auscultation bilaterally; no rales, rhonchi or wheezing; respirations unlabored    Heart::   Regular rate and rhythm; no murmur, rub, or gallop.   Abdomen:   Soft, non-tender, non-distended; normal bowel sounds; no masses, no organomegaly    Genitalia:   Deferred    Rectal:   Deferred    Extremities:  No cyanosis, clubbing or edema    Pulses:  2+ and symmetric    Skin:  No jaundice, rashes, or lesions  "   Lymph nodes:  No palpable cervical lymphadenopathy        Lab Results:   No visits with results within 1 Day(s) from this visit.   Latest known visit with results is:   Office Visit on 12/28/2023   Component Date Value    POCT SARS-CoV-2 Ag 12/28/2023 Negative     VALID CONTROL 12/28/2023 Valid          Radiology Results:   No results found.

## 2024-02-23 NOTE — PATIENT INSTRUCTIONS
Scheduled date of EGD(as of today):4/11/24  Physician performing EGD: Dr. Hennessy  [facility] of EGD[facility]  Instructions reviewed with patient by: Amanda  Clearances: N/A

## 2024-02-26 DIAGNOSIS — F90.0 ATTENTION DEFICIT HYPERACTIVITY DISORDER (ADHD), PREDOMINANTLY INATTENTIVE TYPE: ICD-10-CM

## 2024-02-26 RX ORDER — DEXMETHYLPHENIDATE HYDROCHLORIDE 15 MG/1
15 CAPSULE, EXTENDED RELEASE ORAL DAILY
Qty: 30 CAPSULE | Refills: 0 | Status: SHIPPED | OUTPATIENT
Start: 2024-02-26 | End: 2024-03-01 | Stop reason: SDUPTHER

## 2024-02-26 NOTE — TELEPHONE ENCOUNTER
Medication Refill Request     Name of Medication focalin XR 15 mg 24 hr capsule  Dose/Frequency take 1 capsule daily  Quantity 15 mg capsule  Verified pharmacy   [x]  Verified ordering Provider   [x]  Does patient have enough for the next 3 days? Yes [x] No []  Does patient have a follow-up appointment scheduled? Yes [x] No []   If so when is appointment: 5/30

## 2024-02-27 ENCOUNTER — NURSE TRIAGE (OUTPATIENT)
Age: 19
End: 2024-02-27

## 2024-02-27 NOTE — TELEPHONE ENCOUNTER
"SPOKE WITH PT MOM CALLING TO CLARIFY DOSE AND DIRECTIONS FOR MIRALAX. ALL QUESTIONS ANSWERED.         Reason for Disposition   Information only question and nurse able to answer    Answer Assessment - Initial Assessment Questions  1. REASON FOR CALL or QUESTION: \"What is your reason for calling today?\" or \"How can I best help you?\" or \"What question do you have that I can help answer?\"      PT MOM CALLING TO CLARIFY DOSE AND DIRECTIONS FOR MIRALAX.    Protocols used: Information Only Call - No Triage-ADULT-[place]    "

## 2024-02-29 ENCOUNTER — TELEPHONE (OUTPATIENT)
Dept: OTHER | Facility: OTHER | Age: 19
End: 2024-02-29

## 2024-02-29 NOTE — TELEPHONE ENCOUNTER
Patient mom called saying that the [facility] is out of the medication and is not sure when they are going to have the medication back in sock. Patient mom is asking if the medication Foculin XR 15 mg can be sent to the [facility] in [place]. Patient mom is asking if the office can give her a call.

## 2024-02-29 NOTE — TELEPHONE ENCOUNTER
Patients mother called as [facility] pharmacy is out of pts medication Focalin. Writer asked if there is another pharmacy we can send a script to that pt would be interested in going to . Patients mother shared she will see if other pharmacies have medication in stock, if not can provider send a replacement medication.

## 2024-03-01 DIAGNOSIS — F90.0 ATTENTION DEFICIT HYPERACTIVITY DISORDER (ADHD), PREDOMINANTLY INATTENTIVE TYPE: ICD-10-CM

## 2024-03-01 RX ORDER — DEXMETHYLPHENIDATE HYDROCHLORIDE 15 MG/1
15 CAPSULE, EXTENDED RELEASE ORAL DAILY
Qty: 30 CAPSULE | Refills: 0 | Status: SHIPPED | OUTPATIENT
Start: 2024-03-01

## 2024-03-01 NOTE — TELEPHONE ENCOUNTER
Rx sent to [facility] in [place].  Can you please call patient's mother and ask if there is anything else she need to speak to me about?  If so, can you find out more information?  Thank you.

## 2024-03-01 NOTE — TELEPHONE ENCOUNTER
LM for Mom, Vanessa. Kathleen's medication was sent to [facility] in [place]. If there is anything else she needed to speak to Nessa about she can call the nursing line Provided number

## 2024-03-22 ENCOUNTER — ANESTHESIA EVENT (OUTPATIENT)
Dept: ANESTHESIOLOGY | Facility: HOSPITAL | Age: 19
End: 2024-03-22

## 2024-03-22 ENCOUNTER — ANESTHESIA (OUTPATIENT)
Dept: ANESTHESIOLOGY | Facility: HOSPITAL | Age: 19
End: 2024-03-22

## 2024-03-26 DIAGNOSIS — F90.0 ATTENTION DEFICIT HYPERACTIVITY DISORDER (ADHD), PREDOMINANTLY INATTENTIVE TYPE: ICD-10-CM

## 2024-03-26 NOTE — TELEPHONE ENCOUNTER
Medication Refill Request     Name of Medication Foclain XR   Dose/Frequency take 1 capsule by mouth daily  Quantity 15 MG 24 Hr capsule  Verified pharmacy   [x]  Verified ordering Provider   [x]  Does patient have enough for the next 3 days? Yes [x] No []  Does patient have a follow-up appointment scheduled? Yes [x] No []   If so when is appointment: 5/30

## 2024-03-27 ENCOUNTER — TELEPHONE (OUTPATIENT)
Dept: GASTROENTEROLOGY | Facility: CLINIC | Age: 19
End: 2024-03-27

## 2024-03-27 DIAGNOSIS — F90.0 ATTENTION DEFICIT HYPERACTIVITY DISORDER (ADHD), PREDOMINANTLY INATTENTIVE TYPE: ICD-10-CM

## 2024-03-27 RX ORDER — DEXMETHYLPHENIDATE HYDROCHLORIDE 15 MG/1
15 CAPSULE, EXTENDED RELEASE ORAL DAILY
Qty: 30 CAPSULE | Refills: 0 | Status: SHIPPED | OUTPATIENT
Start: 2024-03-27

## 2024-03-27 RX ORDER — DEXMETHYLPHENIDATE HYDROCHLORIDE 15 MG/1
15 CAPSULE, EXTENDED RELEASE ORAL DAILY
Qty: 30 CAPSULE | Refills: 0 | Status: SHIPPED | OUTPATIENT
Start: 2024-03-27 | End: 2024-03-27 | Stop reason: SDUPTHER

## 2024-03-27 NOTE — TELEPHONE ENCOUNTER
Could you please re-send to:  [facility]   [address] [place]   Ph# [phone]    Per call with patient's mother, the [facility] is out of this medication.

## 2024-03-27 NOTE — TELEPHONE ENCOUNTER
Patients mother called and left a voicemail and stated she needs the patients Focalin to be sent to the [facility] on file not the [facility].       [facility]   [address] [place]   Ph# [phone]

## 2024-03-27 NOTE — TELEPHONE ENCOUNTER
PDMP website reviewed. Kathleen has been appropriately adherent to controlled psychotropic medications without evidence of abuse or misuse. As such, will send 30-day refill to pharmacy of choice and follow up as necessary.

## 2024-04-10 RX ORDER — SODIUM CHLORIDE 9 MG/ML
125 INJECTION, SOLUTION INTRAVENOUS CONTINUOUS
Status: CANCELLED | OUTPATIENT
Start: 2024-04-10

## 2024-04-11 ENCOUNTER — HOSPITAL ENCOUNTER (OUTPATIENT)
Dept: GASTROENTEROLOGY | Facility: MEDICAL CENTER | Age: 19
Setting detail: OUTPATIENT SURGERY
End: 2024-04-11
Payer: COMMERCIAL

## 2024-04-11 ENCOUNTER — ANESTHESIA (OUTPATIENT)
Dept: GASTROENTEROLOGY | Facility: MEDICAL CENTER | Age: 19
End: 2024-04-11

## 2024-04-11 ENCOUNTER — ANESTHESIA EVENT (OUTPATIENT)
Dept: GASTROENTEROLOGY | Facility: MEDICAL CENTER | Age: 19
End: 2024-04-11

## 2024-04-11 VITALS
SYSTOLIC BLOOD PRESSURE: 110 MMHG | HEART RATE: 66 BPM | TEMPERATURE: 98 F | RESPIRATION RATE: 16 BRPM | DIASTOLIC BLOOD PRESSURE: 64 MMHG | OXYGEN SATURATION: 99 %

## 2024-04-11 DIAGNOSIS — K29.51 CHRONIC GASTRITIS WITH BLEEDING, UNSPECIFIED GASTRITIS TYPE: ICD-10-CM

## 2024-04-11 DIAGNOSIS — K59.09 CHRONIC CONSTIPATION: ICD-10-CM

## 2024-04-11 DIAGNOSIS — K21.9 GASTROESOPHAGEAL REFLUX DISEASE WITHOUT ESOPHAGITIS: ICD-10-CM

## 2024-04-11 DIAGNOSIS — K20.90 ESOPHAGITIS: Primary | ICD-10-CM

## 2024-04-11 DIAGNOSIS — R10.13 EPIGASTRIC PAIN: ICD-10-CM

## 2024-04-11 LAB
EXT PREGNANCY TEST URINE: NEGATIVE
EXT. CONTROL: NORMAL

## 2024-04-11 PROCEDURE — 88305 TISSUE EXAM BY PATHOLOGIST: CPT | Performed by: STUDENT IN AN ORGANIZED HEALTH CARE EDUCATION/TRAINING PROGRAM

## 2024-04-11 PROCEDURE — 81025 URINE PREGNANCY TEST: CPT | Performed by: ANESTHESIOLOGY

## 2024-04-11 RX ORDER — OMEPRAZOLE 40 MG/1
40 CAPSULE, DELAYED RELEASE ORAL DAILY
Qty: 30 CAPSULE | Refills: 5 | Status: SHIPPED | OUTPATIENT
Start: 2024-04-11 | End: 2024-10-08

## 2024-04-11 RX ORDER — PROPOFOL 10 MG/ML
INJECTION, EMULSION INTRAVENOUS AS NEEDED
Status: DISCONTINUED | OUTPATIENT
Start: 2024-04-11 | End: 2024-04-11

## 2024-04-11 RX ORDER — SODIUM CHLORIDE 9 MG/ML
125 INJECTION, SOLUTION INTRAVENOUS CONTINUOUS
Status: DISCONTINUED | OUTPATIENT
Start: 2024-04-11 | End: 2024-04-15 | Stop reason: HOSPADM

## 2024-04-11 RX ORDER — LIDOCAINE HYDROCHLORIDE 20 MG/ML
INJECTION, SOLUTION EPIDURAL; INFILTRATION; INTRACAUDAL; PERINEURAL AS NEEDED
Status: DISCONTINUED | OUTPATIENT
Start: 2024-04-11 | End: 2024-04-11

## 2024-04-11 RX ADMIN — PROPOFOL 100 MG: 10 INJECTION, EMULSION INTRAVENOUS at 14:31

## 2024-04-11 RX ADMIN — PROPOFOL 200 MG: 10 INJECTION, EMULSION INTRAVENOUS at 14:27

## 2024-04-11 RX ADMIN — PROPOFOL 50 MG: 10 INJECTION, EMULSION INTRAVENOUS at 14:33

## 2024-04-11 RX ADMIN — LIDOCAINE HYDROCHLORIDE 100 MG: 20 INJECTION, SOLUTION EPIDURAL; INFILTRATION; INTRACAUDAL at 14:27

## 2024-04-11 RX ADMIN — SODIUM CHLORIDE 125 ML/HR: 0.9 INJECTION, SOLUTION INTRAVENOUS at 14:19

## 2024-04-11 RX ADMIN — PROPOFOL 100 MG: 10 INJECTION, EMULSION INTRAVENOUS at 14:29

## 2024-04-11 NOTE — ANESTHESIA POSTPROCEDURE EVALUATION
Post-Op Assessment Note    CV Status:  Stable    Pain management: adequate       Mental Status:  Alert and awake   Hydration Status:  Euvolemic   PONV Controlled:  Controlled   Airway Patency:  Patent     Post Op Vitals Reviewed: Yes    No anethesia notable event occurred.    Staff: Anesthesiologist           /64   Pulse 66   Temp 98 °F (36.7 °C) (Temporal)   Resp 16   LMP 03/29/2024 (Approximate) Comment: urine preg. negative  SpO2 99%

## 2024-04-11 NOTE — ANESTHESIA PREPROCEDURE EVALUATION
Procedure:  EGD    Relevant Problems   ANESTHESIA (within normal limits)      CARDIO   (+) Cardiac murmur      ENDO (within normal limits)      NEURO/PSYCH   (+) Acute intractable headache   (+) Depressive disorder   (+) Social anxiety disorder      PULMONARY (within normal limits)        Physical Exam    Airway    Mallampati score: I  TM Distance: >3 FB  Neck ROM: full     Dental        Cardiovascular  Cardiovascular exam normal    Pulmonary  Pulmonary exam normal     Other Findings  post-pubertal.      Anesthesia Plan  ASA Score- 2     Anesthesia Type- IV sedation with anesthesia with ASA Monitors.         Additional Monitors:     Airway Plan:            Plan Factors-    Chart reviewed.    Patient summary reviewed.                  Induction- intravenous.    Postoperative Plan-     Informed Consent- Anesthetic plan and risks discussed with patient.

## 2024-04-11 NOTE — H&P
History and Physical - [facility]  Kathleen Guy 18 y.o. female MRN: [id number]                  HPI: Kathleen Guy is a 18 y.o. year old female who presents for EGD to investigate abdominal pain, GERD.  Patient also has history of constipation.      REVIEW OF SYSTEMS: Per the HPI, and otherwise unremarkable.    Historical Information   Past Medical History:   Diagnosis Date    Anxiety     CASTAÑEDA (dyspnea on exertion)     GERD (gastroesophageal reflux disease)     Psychiatric disorder     Tinea versicolor      Past Surgical History:   Procedure Laterality Date    DENTAL SURGERY  2017    several baby teeth pulled    WISDOM TOOTH EXTRACTION       Social History   Social History     Substance and Sexual Activity   Alcohol Use No     Social History     Substance and Sexual Activity   Drug Use No     Social History     Tobacco Use   Smoking Status Never   Smokeless Tobacco Never     Family History   Problem Relation Age of Onset    No Known Problems Mother     No Known Problems Father     Cancer Maternal Grandmother     Colon cancer Maternal Grandmother     Cancer Maternal Grandfather     Diabetes Maternal Grandfather     Hypertension Maternal Grandfather     Thyroid disease Maternal Grandfather     Other Maternal Grandfather         Thyroid Disorder    Dementia Maternal Grandfather     Hypertension Paternal Grandfather     Heart disease Paternal Grandfather     Osteoporosis Paternal Grandfather     Stroke Paternal Grandfather     Coronary artery disease Paternal Grandfather     Migraines Neg Hx     Learning disabilities Neg Hx        Meds/Allergies     (Not in a hospital admission)      No Known Allergies    Objective     Blood pressure 108/66, pulse (!) 52, temperature 98 °F (36.7 °C), temperature source Temporal, resp. rate 16, last menstrual period 03/29/2024, SpO2 100%.      PHYSICAL EXAM    Gen: NAD  CV: RRR  CHEST: Clear  ABD: soft, NT/ND  EXT: no edema      ASSESSMENT/PLAN: Kathleen Guy  is a 18 y.o. year old female who presents for EGD to investigate abdominal pain, GERD.  Patient also has history of constipation. The patient is stable and optimized for the procedure, we reviewed risk and benefits. Risk include but not limited to infection, bleeding, perforation and missing a lesion.

## 2024-04-15 PROCEDURE — 88305 TISSUE EXAM BY PATHOLOGIST: CPT | Performed by: STUDENT IN AN ORGANIZED HEALTH CARE EDUCATION/TRAINING PROGRAM

## 2024-04-16 NOTE — TELEPHONE ENCOUNTER
LABOR CHECK      Name: Chandler Andrade MRN: [id number]  SSN: [id number]    YOB: LABOR CHECK      Name: Chandler Andrade MRN: [id number]  SSN: [id number]    YOB: Chandler Andrade MRN: [id number]  SSN: [id number]    YOB: LABOR CHECK      Name: Chandler Andrade MRN: [id number]  SSN: [id number]    YOB: Eng   IUPC placed by Dr. Eng    1905 - CRNA at bedside giving bolus for epidural    1910 - Bedside shift change report given to ANNIE Gil RN (oncoming nurse) by HERBRET Haley Patient placed onto fetal monitoring.     225. Amnisure positive.    230. Dr. Thompson called with the patients SBAR report and about her arrival. Dr. Juarez will see her shortly.     233. Dr. Juarez HERBERT Haley RN (oncoming nurse) by ANNIE Judd RN (offgoing nurse). Report included the following information Nurse Handoff Report, Index, Intake/Output, MAR, and Recent Results.      1910- Bedside and Verbal shift change report given to LEOPOLDO Myrick (oncoming nurse) by LEOPOLDO Joyce (PCA bolus from epidural.     2307- NAHOMI Nicole Patient's mother left a voicemail at the office returning a voicemail in regard to patient appointment on 6/6/23 at 3:00pm  Patient's mother confirmed that she is aware of patient appointment today and patient will be attending appointment  Post-Operative  Day 1    Chandler Andrade     Information for the patient's :  POLA Andrade [[id number]]   , Low Transverse  Patient doing well without significant complaint. Nausea and vomiting resolved, tolerating liquids, no flatus, hood in place.    Vitals:    Vitals:    04/15/Post-Operative  Day 2    Chandler Andrade     Tad Eng MD

## 2024-04-17 ENCOUNTER — TELEPHONE (OUTPATIENT)
Dept: PSYCHIATRY | Facility: CLINIC | Age: 19
End: 2024-04-17

## 2024-04-17 NOTE — TELEPHONE ENCOUNTER
Patients mother called the office and left a voice mail wanting to change the time of the patients appt due to her having a new job. Writer returned the call and was able to assist with this and move the patients time from 10:30 am to 3:30 pm on 5/30/2024.

## 2024-04-26 DIAGNOSIS — F90.0 ATTENTION DEFICIT HYPERACTIVITY DISORDER (ADHD), PREDOMINANTLY INATTENTIVE TYPE: ICD-10-CM

## 2024-04-26 RX ORDER — DEXMETHYLPHENIDATE HYDROCHLORIDE 15 MG/1
15 CAPSULE, EXTENDED RELEASE ORAL DAILY
Qty: 30 CAPSULE | Refills: 0 | Status: SHIPPED | OUTPATIENT
Start: 2024-04-26

## 2024-04-26 NOTE — TELEPHONE ENCOUNTER
Medication Refill Request     Name of Medication Focalin XR  Dose/Frequency 15mg daily  Quantity 30 capsules  Verified pharmacy   [x]  Verified ordering Provider   [x]  Does patient have enough for the next 3 days? Yes [] No [x]  Does patient have a follow-up appointment scheduled? Yes [x] No []   If so when is appointment: 5/30 @3:30

## 2024-05-28 DIAGNOSIS — F90.0 ATTENTION DEFICIT HYPERACTIVITY DISORDER (ADHD), PREDOMINANTLY INATTENTIVE TYPE: ICD-10-CM

## 2024-05-28 DIAGNOSIS — F41.1 GAD (GENERALIZED ANXIETY DISORDER): ICD-10-CM

## 2024-05-28 RX ORDER — DEXMETHYLPHENIDATE HYDROCHLORIDE 15 MG/1
15 CAPSULE, EXTENDED RELEASE ORAL DAILY
Qty: 30 CAPSULE | Refills: 0 | Status: CANCELLED | OUTPATIENT
Start: 2024-05-28

## 2024-05-28 RX ORDER — ESCITALOPRAM OXALATE 20 MG/1
20 TABLET ORAL DAILY
Qty: 90 TABLET | Refills: 1 | Status: SHIPPED | OUTPATIENT
Start: 2024-05-28

## 2024-05-28 NOTE — PSYCH
MEDICATION MANAGEMENT NOTE        [facility] - [facility]RIC [facility]      Name and Date of Birth:  Kathleen Guy 18 y.o. 2005 MRN: [id number]    Date of Visit: May 30, 2024    Reason for Visit:   Chief Complaint   Patient presents with    Follow-up    Medication Management    ADHD    Anxiety         SUBJECTIVE:    Kathleen Guy is a 18 y.o. female with past psychiatric history significant for Generalized Anxiety Disorder and ADHD who was personally seen and evaluated today at the [facility] outpatient clinic for follow-up and medication management. Completes psychiatric assessment without difficulty accompanied by her mother who provides collateral information.     Kathleen endorses compliance with psychotropic medication regimen that consists of Lexapro and Focalin.  At previous outpatient psychiatric appointment with this writer, no medication changes were made; placed on therapy wait list.   She denies any current adverse medication side effects.      Overall, Kathleen  reports that she is doing well on current dose of Focalin.  She did have an episode of distraction yesterday at work, but this was unusual.  Typically she does not have any problems with focus at work. She denies problematic anxiety.  At times, she feels a bit overwhelmed while driving as it is a lot of responsibility, but she stays off main highways and mostly drives secondary roads.  Discussed increasing fluid intake to alleviate constipation.  No other questions/concerns.     Current Rating Scores:     None completed today.    Past Psychiatric History: (unchanged information from previous note copied and italicized) - Information that is bolded has been updated.      General Information: denies formal diagnosis or past psychiatric history, denies past psychiatric hospitalizations, denies past suicide attempts, no h/o self-injurious behaviors, no h/o physical aggression     Past Medication  Trials: Tenex (ineffective), Adderall (emotional and tearful, appetite suppression and insomnia), Strattera 25 mg (limited benefit, ineffective, more depressed), Concerta up to 36 mg daily (ineffective).  Focalin (effective), Intuniv (effective)     Therapist/Counseling Services: never been in therapy  Past psychiatric providers: Dr. Hamilton, Serena Jordan PA, Billie GUO     Family Psychiatric History:   Paternal side - depression and bipolar, anxiety     FH of Suicide - paternal cousin     Substance Abuse History: (unchanged information from previous note copied and italicized) - Information that is bolded has been updated.      Tobacco/alcohol/caffeine: Denies alcohol use, Denies tobacco use, Denies caffeine use  Illicit drugs: Denies history of illicit drug use     No past legal actions or arrests secondary to substance intoxication. The patient denies prior DWIs/DUIs. No history of outpatient/inpatient rehabilitation programs. Kathleen does not exhibit objective evidence of substance withdrawal during today's examination nor does Kathleen appear under the influence of any psychoactive substance.       Social History: (unchanged information from previous note copied and italicized) - Information that is bolded has been updated.      Developmental: Denies a history of milestone/developmental delay. Denies a history of in-utero exposure to toxins/illicit substances. IEP since 1st grade.  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 Kathleen Guy has no history of arrests or violence with a deadly weapon.      Traumatic History: (unchanged information from previous note copied and italicized) - Information that is bolded has been updated.      Abuse: none  Other Traumatic Events: none     Past Medical  History:    Past Medical History:   Diagnosis Date    Anxiety     CASTAÑEDA (dyspnea on exertion)     GERD (gastroesophageal reflux disease)     Psychiatric disorder     Tinea versicolor         Past Surgical History:   Procedure Laterality Date    DENTAL SURGERY  2017    several baby teeth pulled    WISDOM TOOTH EXTRACTION       No Known Allergies    Substance Abuse History:    Social History     Substance and Sexual Activity   Alcohol Use No     Social History     Substance and Sexual Activity   Drug Use No       Social History:    Social History     Socioeconomic History    Marital status: Single     Spouse name: Not on file    Number of children: Not on file    Years of education: Not on file    Highest education level: Not on file   Occupational History    Not on file   Tobacco Use    Smoking status: Never    Smokeless tobacco: Never   Vaping Use    Vaping status: Never Used   Substance and Sexual Activity    Alcohol use: No    Drug use: No    Sexual activity: Not on file   Other Topics Concern    Not on file   Social History Narrative    Lives with Mom & Dad- no siblings    Graduated [employer].        Finished 1 year at [employer]    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        Pets/Animals: yes birds 4    /After School Program:yes 12 th grade In person    Carbon Monoxide/Smoke detectors in home: yes    Fire Place: no    Exposure to Mold: yes basement is not finished    Carpet in Home: yes    Stuffed Animals (Toys): yes sleeps with stuffed animals     Tobacco Use: Exposure to smoke no    E-Cigarette/Vaping: Exposure to E-Cigarette/Vaping no             Social Determinants of Health     Financial Resource Strain: Not on file   Food Insecurity: Not on file   Transportation Needs: Not on file   Physical Activity: Not on file   Stress: Not on file   Social Connections: Not on file   Intimate Partner Violence: Not on file   Housing Stability: Not on file       Family Psychiatric History:      Family History   Problem Relation Age of Onset    No Known Problems Mother     No Known Problems Father     Cancer Maternal Grandmother     Colon cancer Maternal Grandmother     Cancer Maternal Grandfather     Diabetes Maternal Grandfather     Hypertension Maternal Grandfather     Thyroid disease Maternal Grandfather     Other Maternal Grandfather         Thyroid Disorder    Dementia Maternal Grandfather     Hypertension Paternal Grandfather     Heart disease Paternal Grandfather     Osteoporosis Paternal Grandfather     Stroke Paternal Grandfather     Coronary artery disease Paternal Grandfather     Migraines Neg Hx     Learning disabilities Neg Hx        History Review: The following portions of the patient's history were reviewed and updated as appropriate: allergies, current medications, past medical history, and past social history.         OBJECTIVE:     Vital signs in last 24 hours:    There were no vitals filed for this visit.    Mental Status Evaluation:    Appearance age appropriate, casually dressed   Behavior cooperative, calm   Speech normal rate, normal volume, normal pitch   Mood euthymic   Affect normal range and intensity, appropriate   Thought Processes organized, goal directed   Associations intact associations   Thought Content no overt delusions   Perceptual Disturbances: no auditory hallucinations, no visual hallucinations   Abnormal Thoughts  Risk Potential Suicidal ideation - None  Homicidal ideation - None  Potential for aggression - No   Orientation oriented to: person, place, time/date, and situation   Memory recent and remote memory grossly intact   Consciousness alert and awake   Attention Span Concentration Span attention span and concentration are age appropriate   Intellect appears to be of average intelligence   Insight fair   Judgement fair   Muscle Strength and  Gait normal muscle strength and normal muscle tone, normal gait and normal balance   Motor activity no abnormal  movements   Language no difficulty naming common objects, no difficulty repeating a phrase   Fund of Knowledge adequate knowledge of current events  adequate fund of knowledge regarding past history  adequate fund of knowledge regarding vocabulary    Pain none   Pain Scale 0       Laboratory Results: I have personally reviewed all pertinent laboratory/tests results    Lethality Statement:    Based on today's assessment and clinical criteria, Kathleen contracts for safety and is not an imminent risk of harm to self or others. Outpatient level of care is deemed appropriate at this current time. She understands that if they can no longer contract for safety, they need to call the office or report to their nearest Emergency Room for immediate evaluation.    Assessment/Plan:     In summary, Kathleen Guy is a 18 y.o.  female, Single (never ), domiciled with parents, currently employed ([employer], bagger) and PT student at [employer], w/ no significant PMH and PPH of JUDSON, ADHD, no prior psychiatric admissions, no prior SA, no h/o self-injurious behavior,  who presented to the mental health clinic for the initial intake and psychiatric evaluation on January 25, 2024.  Kathleen was a former patient of SARI Albert (last visit on 9/8/23) on Lexapro 20 mg daily, Focalin XR 10 mg daily, Intuniv 2 mg at bedtime, Atarax 25 mg twice daily as needed.  Tolerating medication well with no medication side effects observed or reported.  Not actively involved in individual psychotherapy.  The patient reports first meeting with psychiatrist in fifth grade due to symptoms of ADHD and started on medications.  She also reports pathologic anxiety starting in childhood which was particularly focused on events occurring in school (difficulty with friendships, teachers, and academics).  Reports that panic is symptoms were prevalent prior to the start of Lexapro, but have not occurred since that time.  Currently, she reports ADHD  symptoms are interfering with her performance both academically and professionally.  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  While at school, she finds her mind wandering often while in class.  She is unable to recollect whether current dose of Focalin has been effective at alleviating the symptoms as she has not gone any days without the medication.  Reports that dose was not increased during her last session due to concerns of increased anxiety with increased dose.  However, it is apparent that the patient is experiencing significant struggle with ADHD symptoms during time of encounter.  Discussed dose optimization of Focalin to 15 mg daily.  Currently, her appetite is baseline with no change in weight over the last several months.  Energy level is baseline and appropriate.  She denies insomnia.  Typically goes to bed around 10 PM and wakes at 9 AM averaging 11 hours of sleep each night.  Wakes refreshed.  She enjoys painting, large puzzles, photography.  Denies anhedonia, hopelessness, helplessness, worthlessness, or guilt.  No thought constriction related to death.  Denies SI/HI, intent or plan upon direct inquiry at this time.  PHQ-9 score: 11; JUDSON-7 score: 6.  Her current presentation meets criteria for ADHD, combined type, JUDSON.  Focalin increased to 15 mg daily secondary to problematic ADHD symptoms affecting both employment and academics.     Psychopharmacologically, Kathleen is doing well on current dose of stimulant.  Some anxiety while driving, but keeps to secondary roads.  No medication changes.    Risks/benefits/alternativies to treatment discussed, including a myriad of potential adverse medication side effects, to which Kathleen voiced understanding and consented fully to treatment.  Also, patient is amenable to calling/contacting the outpatient office including this writer if any acute adverse effects of their medication regimen  arise in addition to any comments or concerns pertaining to their psychiatric management.         Plan:  Continue Focalin to 15 mg daily for ADHD  Continue Lexapro 20 mg daily for anxiety  Psychotherapy- placed on therapy wait list.   Follow up with primary care provider for ongoing medical care  Follow up with this provider in 3 months     Diagnoses and all orders for this visit:    Attention deficit hyperactivity disorder (ADHD), predominantly inattentive type  -     dexmethylphenidate (Focalin XR) 15 MG 24 hr capsule; Take 1 capsule (15 mg total) by mouth daily Max Daily Amount: 15 mg  -     dexmethylphenidate (Focalin XR) 15 MG 24 hr capsule; Take 1 capsule (15 mg total) by mouth daily Max Daily Amount: 15 mg Do not start before June 29, 2024.  -     dexmethylphenidate (Focalin XR) 15 MG 24 hr capsule; Take 1 capsule (15 mg total) by mouth daily Max Daily Amount: 15 mg Do not start before July 29, 2024.    Social anxiety disorder           - Psychoeducation provided regarding the importance of exercise and health dietary choices and their impact on mood, energy, and motivation.  - Counseled to avoid ETOH, illicit substances, and nicotine secondary to the detrimental effects of these substances on mental and physical health.  - Encouraged to engage in non-verbal forms of therapy such as art therapy, music therapy, and mindfulness.   Aware of 24 hour and weekend coverage for urgent situations accessed by calling [facility] main practice number    Medications Risks/Benefits      Risks, Benefits And Possible Side Effects Of Medications:    Risks, benefits, and possible side effects of medications explained to Kathleen including risk of suicidality and serotonin syndrome related to treatment with antidepressants and risks of cardiovascular side effects including elevated blood pressure, risk of misuse, abuse or dependence and risk of increased anxiety related to treatment with stimulant  medications. She verbalizes understanding and agreement for treatment.    Controlled Medication Discussion:     Kathleen has been filling controlled prescriptions on time as prescribed according to [place] Prescription Drug Monitoring Program    Psychotherapy Provided:     Individual psychotherapy provided: No  Medication education provided to Kathleen  Reassurance and supportive therapy provided     Treatment Plan:    Completed and signed during the session: Not applicable - Treatment Plan not due at this session    Visit Time    Visit Start Time: 3:30 PM  Visit Stop Time: 3:55 PM  Total Visit Duration:  25 minutes    SARI Carlson 05/30/24    This note was completed in part utilizing [employer]. Grammatical, translation, syntax errors, random word insertions, spelling mistakes, and incomplete sentences may be an occasional consequence of this system secondary to software limitations with voice recognition, ambient noise, and hardware issues. If you have any questions or concerns about the content, text, or information contained within the body of this dictation, please contact the provider for clarification.

## 2024-05-28 NOTE — TELEPHONE ENCOUNTER
Medication Refill Request     Name of Medication lexapro  Dose/Frequency 20mg take 1 tablet by mouth daily   Quantity 90  Verified pharmacy   [x]  Verified ordering Provider   [x]  Does patient have enough for the next 3 days? Yes [x] No []  Does patient have a follow-up appointment scheduled? Yes [x] No []   If so when is appointment: 5/30/2024 3:30pm

## 2024-05-28 NOTE — TELEPHONE ENCOUNTER
Medication Refill Request     Name of Medication focalin xr  Dose/Frequency 15mg take 1 capsule by mouth daily  Quantity 30  Verified pharmacy   [x]  Verified ordering Provider   [x]  Does patient have enough for the next 3 days? Yes [x] No []  Does patient have a follow-up appointment scheduled? Yes [x] No []   If so when is appointment: 5/30/2024 3:30pm

## 2024-05-30 ENCOUNTER — TELEPHONE (OUTPATIENT)
Dept: PSYCHIATRY | Facility: CLINIC | Age: 19
End: 2024-05-30

## 2024-05-30 ENCOUNTER — OFFICE VISIT (OUTPATIENT)
Dept: PSYCHIATRY | Facility: CLINIC | Age: 19
End: 2024-05-30

## 2024-05-30 DIAGNOSIS — F40.10 SOCIAL ANXIETY DISORDER: Chronic | ICD-10-CM

## 2024-05-30 DIAGNOSIS — F90.0 ATTENTION DEFICIT HYPERACTIVITY DISORDER (ADHD), PREDOMINANTLY INATTENTIVE TYPE: Primary | ICD-10-CM

## 2024-05-30 RX ORDER — DEXMETHYLPHENIDATE HYDROCHLORIDE 15 MG/1
15 CAPSULE, EXTENDED RELEASE ORAL DAILY
Qty: 30 CAPSULE | Refills: 0 | Status: SHIPPED | OUTPATIENT
Start: 2024-05-30

## 2024-05-30 RX ORDER — DEXMETHYLPHENIDATE HYDROCHLORIDE 15 MG/1
15 CAPSULE, EXTENDED RELEASE ORAL DAILY
Qty: 30 CAPSULE | Refills: 0 | Status: SHIPPED | OUTPATIENT
Start: 2024-06-29

## 2024-05-30 RX ORDER — DEXMETHYLPHENIDATE HYDROCHLORIDE 15 MG/1
15 CAPSULE, EXTENDED RELEASE ORAL DAILY
Qty: 30 CAPSULE | Refills: 0 | Status: SHIPPED | OUTPATIENT
Start: 2024-07-29

## 2024-05-30 NOTE — TELEPHONE ENCOUNTER
Patients mother called the office and left a voicemail on 5/29 in regards to the patients medications that had been called in for refills. She wanted to know if they went through to the pharmacy or not. Writer returned the patients mothers call and informed her that the one [facility] while the other was denied and they should speak with the provider about that at the patients appt today 5/30.

## 2024-06-25 ENCOUNTER — TELEPHONE (OUTPATIENT)
Dept: PSYCHIATRY | Facility: CLINIC | Age: 19
End: 2024-06-25

## 2024-06-25 NOTE — TELEPHONE ENCOUNTER
Patients mother called the office and left a voicemail seeking a refill of the patients Focalin 15 mg. Writer checked the patients chart and returned the call. Writer left a vm that there is a refill at the pharmacy for 6/29.

## 2024-07-19 ENCOUNTER — OFFICE VISIT (OUTPATIENT)
Dept: GASTROENTEROLOGY | Facility: CLINIC | Age: 19
End: 2024-07-19
Payer: COMMERCIAL

## 2024-07-19 VITALS
TEMPERATURE: 98.3 F | WEIGHT: 140 LBS | BODY MASS INDEX: 21.22 KG/M2 | HEIGHT: 68 IN | SYSTOLIC BLOOD PRESSURE: 106 MMHG | DIASTOLIC BLOOD PRESSURE: 64 MMHG

## 2024-07-19 DIAGNOSIS — K59.09 CHRONIC CONSTIPATION: Primary | ICD-10-CM

## 2024-07-19 DIAGNOSIS — K20.90 ESOPHAGITIS: ICD-10-CM

## 2024-07-19 DIAGNOSIS — K29.51 CHRONIC GASTRITIS WITH BLEEDING, UNSPECIFIED GASTRITIS TYPE: ICD-10-CM

## 2024-07-19 DIAGNOSIS — K21.9 CHRONIC GERD: ICD-10-CM

## 2024-07-19 PROCEDURE — 99214 OFFICE O/P EST MOD 30 MIN: CPT | Performed by: INTERNAL MEDICINE

## 2024-07-19 NOTE — PROGRESS NOTES
[facility] - Outpatient Follow-up Note  Kathleen Guy 18 y.o. female MRN: [id number]  Encounter: [id number]          ASSESSMENT AND PLAN:      Chronic GERD  Esophagitis  Chronic constipation  Gastritis      Patient heartburn is controlled. Encouraged her to continue Omeprazole 40 mg daily. We will discuss about decreasing those to 20 mg at next office visit if patient continues to have controlled symptoms. Regarding her constipation, it is mostly controlled. Encouraged patient to continue with good hydration and to take MiraLAX twice a day on two days of the week when she has constipation more than usual. Other days she can take once daily Miralax. Reiterated to patient that goal of therapy is to have one to two soft to loose bowel moments per day with feeling of complete evacuation. Patient agreeable with plan.    RTC in 6 months.     Kalpesh Hennessy MD  Gastroenterology  [facility]  Date: July 19, 2024    ______________________________________________________________________    SUBJECTIVE:  18-year-old with Chronic constipation, chronic Gerd social anxiety disorder, presents for follow up.    During last office visit, patient reported abdominal pain and heartburn, as well as chronic hard to pass stools. No other alarm symptoms. MiraLAX was started and good hydration was encouraged. EGD was performed later and mild esophagitis and gastritis with gastric and small bowel biopsy negative for significant findings. Omeprazole 40 mg daily was started.     Patient currently reports rare acid reflux episodes. She continues to take omeprazole 40 mg daily. She reports that since starting MiraLAX, she has been having regular moments usually. One to two times a week she has hard to pass tools. She has been taking good amount of water intake. No blood and stools.            REVIEW OF SYSTEMS IS OTHERWISE NEGATIVE.      Historical Information   Past Medical History:  "  Diagnosis Date    Anxiety     CASTAÑEDA (dyspnea on exertion)     GERD (gastroesophageal reflux disease)     Psychiatric disorder     Tinea versicolor      Past Surgical History:   Procedure Laterality Date    DENTAL SURGERY  2017    several baby teeth pulled    UPPER GASTROINTESTINAL ENDOSCOPY      WISDOM TOOTH EXTRACTION       Social History   Social History     Substance and Sexual Activity   Alcohol Use No     Social History     Substance and Sexual Activity   Drug Use No     Social History     Tobacco Use   Smoking Status Never   Smokeless Tobacco Never     Family History   Problem Relation Age of Onset    No Known Problems Mother     No Known Problems Father     Cancer Maternal Grandmother     Colon cancer Maternal Grandmother     Cancer Maternal Grandfather     Diabetes Maternal Grandfather     Hypertension Maternal Grandfather     Thyroid disease Maternal Grandfather     Other Maternal Grandfather         Thyroid Disorder    Dementia Maternal Grandfather     Hypertension Paternal Grandfather     Heart disease Paternal Grandfather     Osteoporosis Paternal Grandfather     Stroke Paternal Grandfather     Coronary artery disease Paternal Grandfather     Migraines Neg Hx     Learning disabilities Neg Hx        Meds/Allergies       Current Outpatient Medications:     dexmethylphenidate (Focalin XR) 15 MG 24 hr capsule    dexmethylphenidate (Focalin XR) 15 MG 24 hr capsule    [START ON 7/29/2024] dexmethylphenidate (Focalin XR) 15 MG 24 hr capsule    escitalopram (LEXAPRO) 20 mg tablet    guanFACINE HCl ER (INTUNIV) 2 MG TB24    hydrOXYzine HCL (ATARAX) 25 mg tablet    Multiple Vitamin (MULTI-VITAMIN DAILY PO)    omeprazole (PriLOSEC) 40 MG capsule    polyethylene glycol (MIRALAX) 17 g packet    Sodium Fluoride 5000 PPM 1.1 % PSTE    benzonatate (TESSALON PERLES) 100 mg capsule    No Known Allergies        Objective     Blood pressure 106/64, temperature 98.3 °F (36.8 °C), height 5' 8\" (1.727 m), weight 63.5 kg (140 " lb). Body mass index is 21.29 kg/m².      PHYSICAL EXAM:      General Appearance:   Alert, cooperative, no distress   HEENT:   Normocephalic, atraumatic, anicteric.     Neck:  Supple, symmetrical, trachea midline   Lungs:   Clear to auscultation bilaterally; no rales, rhonchi or wheezing; respirations unlabored    Heart::   Regular rate and rhythm; no murmur, rub, or gallop.   Abdomen:   Soft, non-tender, non-distended; normal bowel sounds; no masses, no organomegaly    Genitalia:   Deferred    Rectal:   Deferred    Extremities:  No cyanosis, clubbing or edema    Pulses:  2+ and symmetric    Skin:  No jaundice, rashes, or lesions    Lymph nodes:  No palpable cervical lymphadenopathy        Lab Results:   No visits with results within 1 Day(s) from this visit.   Latest known visit with results is:   Hospital Outpatient Visit on 04/11/2024   Component Date Value    EXT Preg Test, Ur 04/11/2024 Negative     Control 04/11/2024 Valid     Case Report 04/11/2024                      Value:Surgical Pathology Report                         Case: [id number]                                  Authorizing Provider:  Kalpesh Hennessy MD         Collected:           04/11/2024 1430              Ordering Location:     [facility]        Received:            04/11/2024 [address] [place] [address] Endoscopy                                                     Pathologist:           Dada Nowak DO                                                          Specimens:   A) - Duodenum, Duodenal Bx, r/o Celiac                                                              B) - Stomach, Gastric Bx, r/o h. pylori                                                    Final Diagnosis 04/11/2024                      Value:A. Duodenum, biopsy:                          - Benign duodenal mucosa within normal limits.                          - No intraepithelial lymphocytosis and no villous blunting.                           - Negative for dysplasia and malignancy.                                                     B. Stomach, biopsy:                          - Antral and oxyntic-type gastric mucosa with mild chronic inactive                           gastritis.                          - No Helicobacter organisms identified on H&E.                          - Negative for intestinal metaplasia, dysplasia, and malignancy.                                Additional Information 04/11/2024                      Value:All reported additional testing was performed with appropriately reactive                           controls.  These tests were developed and their performance                           characteristics determined by [facility] or                           appropriate performing facility, though some tests may be performed on                           tissues which have not been validated for performance characteristics                           (such as staining performed on alcohol exposed cell blocks and decalcified                           tissues).  Results should be interpreted with caution and in the context                           of the patients’ clinical condition. These tests may not be cleared or                           approved by the U.S. Food and Drug Administration, though the FDA has                           determined that such clearance or approval is not necessary. These tests                           are used for clinical purposes and they should not be regarded as                           investigational or for research. This laboratory has been approved by CLIA                           88, designated as a high-complexity laboratory and is qualified to perform                           these tests.                                                    Interpretation performed at [facility], [address] [place] [id number].    Nina  "Description 04/11/2024                      Value:A. The specimen is received in formalin, labeled with the patient's name                           and hospital number, and is designated \" duodenum\".  The specimen consists                           of 4 tan soft tissue fragments measuring range from 0.2-0.4 cm in greatest                           dimension.  Entirely submitted. One screened cassette.                          B. The specimen is received in formalin, labeled with the patient's name                           and hospital number, and is designated \" stomach gastric\".  The specimen                           consists of 4 tan soft tissue fragments measuring range from 0.2-0.5 cm in                           greatest dimension.  Due to the size and consistency of the specimen it                           may not survive histological processing.  Entirely submitted. One screened                           cassette.                                                    Note: The estimated total formalin fixation time based upon information                           provided by the submitting clinician and the standard processing schedule                           is under 72 hours.                                                                              [facility]         Radiology Results:   No results found.   "

## 2024-07-24 DIAGNOSIS — F90.0 ATTENTION DEFICIT HYPERACTIVITY DISORDER (ADHD), PREDOMINANTLY INATTENTIVE TYPE: ICD-10-CM

## 2024-07-24 RX ORDER — DEXMETHYLPHENIDATE HYDROCHLORIDE 15 MG/1
15 CAPSULE, EXTENDED RELEASE ORAL DAILY
Qty: 30 CAPSULE | Refills: 0 | Status: SHIPPED | OUTPATIENT
Start: 2024-07-29

## 2024-07-24 NOTE — TELEPHONE ENCOUNTER
Medication Refill Request     Name of Medication Focalin XR  Dose/Frequency 15mg,  Take 1 capsule (15 mg total) by mouth daily   Quantity 30 capsule  Verified pharmacy   [x]  Verified ordering Provider   [x]  Does patient have enough for the next 3 days? Yes [] No [x]  Does patient have a follow-up appointment scheduled? Yes [x] No []   If so when is appointment: 9/19 @5

## 2024-08-13 ENCOUNTER — PATIENT MESSAGE (OUTPATIENT)
Dept: FAMILY MEDICINE CLINIC | Facility: CLINIC | Age: 19
End: 2024-08-13

## 2024-08-14 ENCOUNTER — OFFICE VISIT (OUTPATIENT)
Dept: FAMILY MEDICINE CLINIC | Facility: CLINIC | Age: 19
End: 2024-08-14
Payer: COMMERCIAL

## 2024-08-14 VITALS
BODY MASS INDEX: 20.92 KG/M2 | OXYGEN SATURATION: 98 % | RESPIRATION RATE: 16 BRPM | WEIGHT: 138 LBS | TEMPERATURE: 98.4 F | SYSTOLIC BLOOD PRESSURE: 100 MMHG | HEIGHT: 68 IN | HEART RATE: 81 BPM | DIASTOLIC BLOOD PRESSURE: 60 MMHG

## 2024-08-14 DIAGNOSIS — K12.1 STOMATITIS: Primary | ICD-10-CM

## 2024-08-14 PROCEDURE — 99213 OFFICE O/P EST LOW 20 MIN: CPT | Performed by: NURSE PRACTITIONER

## 2024-08-14 RX ORDER — METHYLPREDNISOLONE 4 MG
TABLET, DOSE PACK ORAL
Qty: 21 EACH | Refills: 0 | Status: SHIPPED | OUTPATIENT
Start: 2024-08-14

## 2024-08-14 RX ORDER — AMOXICILLIN 875 MG
875 TABLET ORAL 2 TIMES DAILY
COMMUNITY
Start: 2024-08-06

## 2024-08-14 NOTE — PROGRESS NOTES
FAMILY PRACTICE OFFICE VISIT       NAME: Kathleen Guy  AGE: 19 y.o. SEX: female       : 2005        MRN: [id number]    Assessment and Plan   1. Stomatitis  -     methylPREDNISolone 4 MG tablet therapy pack; Use as directed on package     Exam and history is not consistent with thrush, HSV, strep throat, mono.   Appears to be stomatitis on exam.   May be irritation from mouth wash she is using for dental implant as per oral surgery.   Will finish amoxicillin as per oral surgery. Will start medrol dose pack.   She will contact oral surgery to see if mouth wash could be stopped and use warm salt water gargles instead.   She will call if symptoms fail to improve.   Accompanied by her mom today.     Chief Complaint     Chief Complaint   Patient presents with    Sore Throat       History of Present Illness     Kathleen Guy is a 19 year old female presenting today for sore throat.     Sore throat  White patches  Started 2 days ago.   Hurts to swallow now.     No fevers, chills, or sweats.   No nasal congestion.   No cough.     Mildly tired.   No known sick contacts.     Is taking Amoxcillin 875 mg twice daily for dental implant procedure, started , for 10 days.     Is using a prescription mouth was from oral surgeon, since  as well.   The mouth wash burns.             Review of Systems   Review of Systems   Constitutional: Negative.  Negative for chills, diaphoresis, fatigue and fever.   HENT:  Positive for dental problem and sore throat. Negative for congestion, ear pain, postnasal drip, rhinorrhea and sinus pressure.    Respiratory:  Negative for cough.    Cardiovascular: Negative.    Musculoskeletal:  Negative for myalgias.   Neurological:  Negative for headaches.       I have reviewed the patient's medical history in detail; there are no changes to the history as noted in the electronic medical record.    Objective     Vitals:    24 1610   BP: 100/60   BP Location: Left arm   Patient  "Position: Sitting   Cuff Size: Standard   Pulse: 81   Resp: 16   Temp: 98.4 °F (36.9 °C)   TempSrc: Temporal   SpO2: 98%   Weight: 62.6 kg (138 lb)   Height: 5' 8\" (1.727 m)     Wt Readings from Last 3 Encounters:   08/14/24 62.6 kg (138 lb)   07/29/24 63.5 kg (140 lb) (69%, Z= 0.49)*   07/19/24 63.5 kg (140 lb) (69%, Z= 0.49)*     * Growth percentiles are based on CDC (Girls, 2-20 Years) data.     Physical Exam  Vitals and nursing note reviewed.   Constitutional:       General: She is not in acute distress.     Appearance: Normal appearance. She is not ill-appearing.   HENT:      Head: Atraumatic.      Right Ear: Tympanic membrane normal.      Left Ear: Tympanic membrane normal.      Nose: Nose normal.      Mouth/Throat:      Pharynx: No pharyngeal swelling.      Tonsils: 0 on the right. 0 on the left.      Comments: Posterior pharynx is red, with multiple tiny papules on the tonsillar pillars and soft palate.   There is no exudate, no pustules, no white coating.      Cardiovascular:      Rate and Rhythm: Normal rate and regular rhythm.      Heart sounds: No murmur heard.  Pulmonary:      Effort: Pulmonary effort is normal.      Breath sounds: Normal breath sounds.   Musculoskeletal:      Cervical back: Normal range of motion and neck supple.   Lymphadenopathy:      Cervical: No cervical adenopathy.   Neurological:      Mental Status: She is alert.   Psychiatric:         Mood and Affect: Mood normal.         Depression Screening and Follow-up Plan: Patient was screened for depression during today's encounter. They screened negative with a PHQ-9 score of 0.        ALLERGIES:  No Known Allergies    Current Medications     Current Outpatient Medications   Medication Sig Dispense Refill    amoxicillin (AMOXIL) 875 mg tablet Take 875 mg by mouth 2 (two) times a day      dexmethylphenidate (Focalin XR) 15 MG 24 hr capsule Take 1 capsule (15 mg total) by mouth daily Max Daily Amount: 15 mg Do not start before July 29, " 2024. 30 capsule 0    escitalopram (LEXAPRO) 20 mg tablet Take 1 tablet (20 mg total) by mouth daily 90 tablet 1    guanFACINE HCl ER (INTUNIV) 2 MG TB24 Take 1 tablet (2 mg total) by mouth daily at bedtime 90 tablet 1    hydrOXYzine HCL (ATARAX) 25 mg tablet Take 1 tablet (25 mg total) by mouth 2 (two) times a day as needed for anxiety 60 tablet 5    methylPREDNISolone 4 MG tablet therapy pack Use as directed on package 21 each 0    Multiple Vitamin (MULTI-VITAMIN DAILY PO) Take by mouth      omeprazole (PriLOSEC) 40 MG capsule Take 1 capsule (40 mg total) by mouth daily 30 capsule 5    polyethylene glycol (MIRALAX) 17 g packet Take 17 g by mouth daily 510 g 5    Sodium Fluoride 5000 PPM 1.1 % PSTE Use as directed       No current facility-administered medications for this visit.         Health Maintenance     Health Maintenance   Topic Date Due    Hepatitis C Screening  Never done    HIV Screening  Never done    COVID-19 Vaccine (5 - 2023-24 season) 09/01/2023    Influenza Vaccine (1) 09/01/2024    Annual Physical  12/28/2024    Depression Screening  08/14/2025    DTaP,Tdap,and Td Vaccines (7 - Td or Tdap) 11/14/2025    Zoster Vaccine (1 of 2) 08/03/2054    RSV Vaccine Age 60+ Years (1 - 1-dose 60+ series) 08/03/2064    HIB Vaccine  Completed    IPV Vaccine  Completed    Meningococcal ACWY Vaccine  Completed    HPV Vaccine  Completed    RSV Vaccine age 0-20 Months  Aged Out    Pneumococcal Vaccine: Pediatrics (0 to 5 Years) and At-Risk Patients (6 to 64 Years)  Aged Out    Hepatitis A Vaccine  Aged Out     Immunization History   Administered Date(s) Administered    COVID-19 PFIZER VACCINE 0.3 ML IM 04/29/2021, 05/20/2021, 12/29/2021, 12/29/2021    DTaP 2004, 2004, 02/15/2005, 11/22/2005, 08/15/2008    DTaP 5 2004, 2004, 02/15/2005, 11/22/2005, 08/15/2008    HPV9 01/09/2023, 02/14/2023, 08/18/2023    Hep B, Adolescent or Pediatric 2004, 2004, 05/24/2005    Hep B, adult  2004, 2004, 05/24/2005    HiB 2004, 2004, 02/15/2005, 11/22/2005    Hib (PRP-OMP) 2004, 2004, 02/15/2005, 11/22/2005    INFLUENZA 02/03/2006, 03/03/2006, 10/08/2015, 10/25/2016, 10/26/2017, 11/23/2018, 11/25/2019, 12/01/2020, 12/23/2021, 12/27/2022    IPV 2004, 2004, 05/24/2005, 08/15/2008    Influenza Quadrivalent Preservative Free 3 years and older IM 10/08/2015, 10/25/2016, 10/26/2017    Influenza, injectable, quadrivalent, preservative free 0.5 mL 11/23/2018, 11/25/2019, 12/01/2020, 12/23/2021, 12/27/2022    Influenza, seasonal, injectable 02/03/2006, 03/03/2006, 11/28/2009, 10/06/2014    MMR 11/22/2005, 08/15/2008    Meningococcal MCV4, Unspecified 11/14/2015, 12/01/2020    Meningococcal MCV4P 11/14/2015, 12/01/2020    Meningococcal, Unknown Serogroups 11/14/2015    Pneumococcal Conjugate PCV 7 2004, 2004, 02/15/2005, 11/22/2005    Tdap 11/14/2015    Varicella 08/09/2005, 08/15/2008       SARI Cowan

## 2024-08-21 ENCOUNTER — TELEPHONE (OUTPATIENT)
Age: 19
End: 2024-08-21

## 2024-08-21 DIAGNOSIS — F90.0 ATTENTION DEFICIT HYPERACTIVITY DISORDER (ADHD), PREDOMINANTLY INATTENTIVE TYPE: ICD-10-CM

## 2024-08-21 RX ORDER — DEXMETHYLPHENIDATE HYDROCHLORIDE 15 MG/1
15 CAPSULE, EXTENDED RELEASE ORAL DAILY
Qty: 30 CAPSULE | Refills: 0 | Status: SHIPPED | OUTPATIENT
Start: 2024-08-27 | End: 2024-08-26

## 2024-08-21 NOTE — TELEPHONE ENCOUNTER
Refill cannot be delegated as it is a controlled substance. Patient has been seen within the last 6 months.     1 [id number] 07/29/2024 07/24/2024 Dexmethylphenidate Hcl (Capsule, Extended Release) 30.0 30 15 MG NA EVANGELINA WINKLER [id number] Commercial Insurance 0 / 0 [place]   1 [id number] 06/29/2024 05/30/2024 Dexmethylphenidate Hcl (Capsule, Extended Release) 30.0 30 15 MG NA EVANGELINA WINKLER [id number] Commercial Insurance 0 / 0 [place]   1 [id number] 05/30/2024 05/30/2024 Dexmethylphenidate Hcl (Capsule, Extended Release) 30.0 30 15 MG NA EVANGELINA WINKLER [id number] Commercial Insurance 0 / 0 [place]   1 [id number] 04/26/2024 04/26/2024 Dexmethylphenidate Hcl (Capsule, Extended Release) 30.0 30 15 MG NA EVANGELINA WINKLER [id number] Commercial Insurance 0 / 0 [place]   1 [id number] 03/27/2024 03/27/2024 Dexmethylphenidate Hcl (Capsule, Extended Release) 30.0 30 15 MG NA EVANGELINA WINKLER [id number] Commercial Insurance 0 / 0 [place]   1 [id number] 03/01/2024 03/01/2024 Dexmethylphenidate Hcl (Capsule, Extended Release) 30.0 30 15 MG NA EVANGELINA WINKLER [id number] Commercial Insurance 0 / 0 [place]   1 [id number] 01/31/2024 01/25/2024 Dexmethylphenidate Hcl (Capsule, Extended Release) 30.0 30 15 MG NA EVANGELINA WINKLER [facility] Commercial Insurance 0 / 0 [place]   1 [id number] 01/02/2024 01/02/2024 Dexmethylphenidate Hcl (Capsule, Extended Release) 30.0 30 10 MG NA AMIR LOGHMANI [facility] Commercial Insurance 0 / 0 [place]   1 [id number] 12/02/2023 12/01/2023 Dexmethylphenidate Hcl (Capsule, Extended Release) 30.0 30 10 MG NA AMIR LOGHMANI [facility] Commercial Insurance 0 / 0 [place]   1 [id number] 11/03/2023 11/03/2023 Dexmethylphenidate Hcl (Capsule, Extended Release) 30.0 30 10 MG NA [facility] Commercial Insurance 0 / 0 [place]

## 2024-08-21 NOTE — TELEPHONE ENCOUNTER
Please advise the mother to contact pharmacies and ask if there is stock of Focalin XR 15 BRAND NAME or Focalin IR 7.5 mg (60 tablets) for BID dosing.  If Brand name is available, she may need a PA which may take more time or perhaps it is only a few dollars more.  She should notify the office once this information is gathered and a script will be sent.  Thank you.

## 2024-08-21 NOTE — TELEPHONE ENCOUNTER
Reason for call:   [x] Refill   [] Prior Auth  [] Other:     Office:   [] PCP/Provider -   [x] Specialty/Provider - Psych    Medication:   Dexmethylphenidate (Focalin XR) 15mg- take 1 capsule by mouth daily      Pharmacy: [facility]    Does the patient have enough for 3 days?   [x] Yes   [] No - Send as HP to POD

## 2024-08-21 NOTE — TELEPHONE ENCOUNTER
Patient mom called the RX Refill Line. Message is being forwarded to the office.     Patient mom Vanessa on consent form state that she have called about 7 different pharmacies to find where have focalin in stock and so far all the pharmacies she have called focalin is on back order and she state she does not know what to do, can someone call her to advised, if something else can be ordered in the mean time or what should her daughter do, Vanessa stated that her daughter have 1 week left of medication and this should give enough time to have a plan before her daughter runs out.  Vanessa state her daughter is not at home but she may be contacted at [phone]

## 2024-08-23 ENCOUNTER — TELEPHONE (OUTPATIENT)
Dept: PSYCHIATRY | Facility: CLINIC | Age: 19
End: 2024-08-23

## 2024-08-23 DIAGNOSIS — F41.1 GAD (GENERALIZED ANXIETY DISORDER): ICD-10-CM

## 2024-08-23 DIAGNOSIS — F90.0 ATTENTION DEFICIT HYPERACTIVITY DISORDER (ADHD), PREDOMINANTLY INATTENTIVE TYPE: ICD-10-CM

## 2024-08-23 RX ORDER — ESCITALOPRAM OXALATE 20 MG/1
20 TABLET ORAL DAILY
Qty: 90 TABLET | Refills: 1 | Status: SHIPPED | OUTPATIENT
Start: 2024-08-23

## 2024-08-23 NOTE — TELEPHONE ENCOUNTER
PATIENTS MOM STATES  SHE SPOKE WITH THE PHARMACY TODAY AND THEY WILL NOT SEND THE REFILL UNITL A NEW SCRIPT IS SENT IN     Reason for call:   [x] Refill   [] Prior Auth  [] Other:     Office:   [] PCP/Provider -   [x] Specialty/Provider -  SARI Carlson -[facility]     Medication:     guanFACINE HCl ER (INTUNIV) 2 MG TB24       Dose/Frequency:     Take 1 tablet (2 mg total) by mouth daily at bedtime       Medication:     escitalopram (LEXAPRO) 20 mg tablet       Dose/Frequency:     Take 1 tablet (20 mg total) by mouth daily       Pharmacy: [facility]     Does the patient have enough for 3 days?   [x] Yes   [] No - Send as HP to POD

## 2024-08-23 NOTE — TELEPHONE ENCOUNTER
For provider review. Please see note: PATIENTS MOM STATES SHE SPOKE WITH THE PHARMACY TODAY AND THEY WILL NOT SEND THE REFILL UNITL A NEW SCRIPT IS SENT IN     She said there is enough for 3 days so sending to primary provider.

## 2024-08-23 NOTE — TELEPHONE ENCOUNTER
Called Kathleen's mom back and reviewed [employer] message with her. She will do that and contact us if any pharmacies have them  in stock.

## 2024-08-23 NOTE — TELEPHONE ENCOUNTER
Patients mother was calling back regarding medication being on back order, mom stated if someone could call her back and if she does not answer please leave detailed message

## 2024-08-23 NOTE — TELEPHONE ENCOUNTER
Vanessa called the office stating that she has not been able to find Focalin 15 MG anywhere.  The [facility] in [place] has 20 MG capsules but not 15.  She is wondering if dose can possibly be increased to 20 MG if it don't make too much of a big difference.  If possible, please send to [facility] on [address] in [place].  OK to leave detailed message on her VM if she don't answer the phone.    Will refer to Nessa Mckeon for review.

## 2024-08-25 RX ORDER — GUANFACINE 2 MG/1
2 TABLET, EXTENDED RELEASE ORAL
Qty: 90 TABLET | Refills: 1 | Status: SHIPPED | OUTPATIENT
Start: 2024-08-25

## 2024-08-26 ENCOUNTER — TELEPHONE (OUTPATIENT)
Age: 19
End: 2024-08-26

## 2024-08-26 DIAGNOSIS — F90.0 ATTENTION DEFICIT HYPERACTIVITY DISORDER (ADHD), PREDOMINANTLY INATTENTIVE TYPE: Primary | ICD-10-CM

## 2024-08-26 RX ORDER — METHYLPHENIDATE HYDROCHLORIDE 36 MG/1
36 TABLET ORAL DAILY
Qty: 30 TABLET | Refills: 0 | Status: SHIPPED | OUTPATIENT
Start: 2024-08-26

## 2024-08-26 NOTE — TELEPHONE ENCOUNTER
Please have the patient's mother search for Concerta 36 mg daily x 30 tablets. I am concerned that the Focalin 20 mg will be too high.

## 2024-08-26 NOTE — TELEPHONE ENCOUNTER
Patient called the RX Refill Line. Message is being forwarded to the office.     Patient is requesting Concerta 36 mg sent to [facility] in [place] as they have it in stock     Please contact patient at [phone]

## 2024-08-26 NOTE — TELEPHONE ENCOUNTER
Mother called stating she has been waiting for a call back. Patient needs refills on her Focalin and the pharmacy does not have 15 mg only 20 mg. Mom states patient only has 3 pills left if someone can give her a call today.

## 2024-08-27 ENCOUNTER — DOCUMENTATION (OUTPATIENT)
Dept: PSYCHIATRY | Facility: CLINIC | Age: 19
End: 2024-08-27

## 2024-08-27 NOTE — TELEPHONE ENCOUNTER
VM left requesting a call back to inform her that a script was sent to pharmacy and if she has any issues with insurance to give us a call back as a prior auth might be needed.

## 2024-08-28 ENCOUNTER — TRANSCRIBE ORDERS (OUTPATIENT)
Dept: GASTROENTEROLOGY | Facility: CLINIC | Age: 19
End: 2024-08-28

## 2024-08-29 DIAGNOSIS — K29.51 CHRONIC GASTRITIS WITH BLEEDING, UNSPECIFIED GASTRITIS TYPE: ICD-10-CM

## 2024-08-29 DIAGNOSIS — K20.90 ESOPHAGITIS: ICD-10-CM

## 2024-08-29 RX ORDER — OMEPRAZOLE 40 MG/1
40 CAPSULE, DELAYED RELEASE ORAL DAILY
Qty: 90 CAPSULE | Refills: 1 | Status: SHIPPED | OUTPATIENT
Start: 2024-08-29 | End: 2025-02-25

## 2024-08-29 NOTE — TELEPHONE ENCOUNTER
Cc'd chart      Reason for call:   [x] Refill   [] Prior Auth  [] Other:     Office:   [] PCP/Provider -   [x] Specialty/Provider -     Medication: Prilosec    Dose/Frequency: 40 mg     Quantity: #90    Pharmacy: [facility]    Does the patient have enough for 3 days?   [] Yes   [] No - Send as HP to POD

## 2024-09-19 ENCOUNTER — OFFICE VISIT (OUTPATIENT)
Dept: PSYCHIATRY | Facility: CLINIC | Age: 19
End: 2024-09-19
Payer: COMMERCIAL

## 2024-09-19 DIAGNOSIS — F90.0 ATTENTION DEFICIT HYPERACTIVITY DISORDER (ADHD), PREDOMINANTLY INATTENTIVE TYPE: Primary | ICD-10-CM

## 2024-09-19 PROCEDURE — 99214 OFFICE O/P EST MOD 30 MIN: CPT | Performed by: NURSE PRACTITIONER

## 2024-09-19 RX ORDER — METHYLPHENIDATE HYDROCHLORIDE 54 MG/1
54 TABLET ORAL DAILY
Qty: 30 TABLET | Refills: 0 | Status: SHIPPED | OUTPATIENT
Start: 2024-09-19

## 2024-09-19 NOTE — ASSESSMENT & PLAN NOTE
Treatment status: not at goal  Treatment considerations: Focalin XR 15 mg not available  Treatment Plan: increase to Concerta 54, switch back to Focalin XR when available  Orders:    methylphenidate (CONCERTA) 54 MG ER tablet; Take 1 tablet (54 mg total) by mouth daily Max Daily Amount: 54 mg

## 2024-09-19 NOTE — PSYCH
MEDICATION MANAGEMENT NOTE        [facility] - [facility]      Name and Date of Birth:  Kathleen Guy 19 y.o. 2005 MRN: [id number]    Date of Visit: September 19, 2024    Reason for Visit:   Chief Complaint   Patient presents with    Follow-up    Medication Management    ADHD         Subjective      Kathleen Guy is a 19 y.o. female with past psychiatric history significant for Generalized Anxiety Disorder and ADHD who was personally seen and evaluated today at the [facility] outpatient clinic for follow-up and medication management. Completes psychiatric assessment without difficulty.     Kathleen endorses compliance with psychotropic medication regimen that consists of Lexapro and Focalin (Concerta 36 mg currently while unable to find Focalin).  At previous outpatient psychiatric appointment with this writer, no medication changes were made.   She denies any current adverse medication side effects.      Overall, Kathleen reports that Concerta was relatively ineffective and makes her tired. She is not depressed or anxious.  ADHD is primary concern at this time.  Called [facility] during appointment and unfortunately they did not have any Focalin XR 15 mg daily.  Will increase Concerta 56 mg daily, but patient and mother will call alternate pharmacies in the interim. No safety concerns.         Current Rating Scores:     None completed today.             Historical Information      In summary, Kathleen Guy is a 18 y.o.  female, Single (never ), domiciled with parents, currently employed ([employer], bagger) and PT student at [employer], w/ no significant PMH and PPH of JUDSON, ADHD, no prior psychiatric admissions, no prior SA, no h/o self-injurious behavior,  who presented to the mental health clinic for the initial intake and psychiatric evaluation on January 25, 2024.  Kathleen was a former patient of SARI Albert (last visit on 9/8/23)  on Lexapro 20 mg daily, Focalin XR 10 mg daily, Intuniv 2 mg at bedtime, Atarax 25 mg twice daily as needed.  Tolerating medication well with no medication side effects observed or reported.  Not actively involved in individual psychotherapy.  The patient reports first meeting with psychiatrist in fifth grade due to symptoms of ADHD and started on medications.  She also reports pathologic anxiety starting in childhood which was particularly focused on events occurring in school (difficulty with friendships, teachers, and academics).  Reports that panic is symptoms were prevalent prior to the start of Lexapro, but have not occurred since that time.  Currently, she reports ADHD symptoms are interfering with her performance both academically and professionally.  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  While at school, she finds her mind wandering often while in class.  She is unable to recollect whether current dose of Focalin has been effective at alleviating the symptoms as she has not gone any days without the medication.  Reports that dose was not increased during her last session due to concerns of increased anxiety with increased dose.  However, it is apparent that the patient is experiencing significant struggle with ADHD symptoms during time of encounter.  Discussed dose optimization of Focalin to 15 mg daily.  Currently, her appetite is baseline with no change in weight over the last several months.  Energy level is baseline and appropriate.  She denies insomnia.  Typically goes to bed around 10 PM and wakes at 9 AM averaging 11 hours of sleep each night.  Wakes refreshed.  She enjoys painting, large puzzles, photography.  Denies anhedonia, hopelessness, helplessness, worthlessness, or guilt.  No thought constriction related to death.  Denies SI/HI, intent or plan upon direct inquiry at this time.  PHQ-9 score: 11; JUDSON-7 score: 6.  Her  current presentation meets criteria for ADHD, combined type, JUDSON.  Focalin increased to 15 mg daily secondary to problematic ADHD symptoms affecting both employment and academics.       Past Psychiatric History: (unchanged information from previous note copied and italicized) - Information that is bolded has been updated.      General Information: denies formal diagnosis or past psychiatric history, denies past psychiatric hospitalizations, denies past suicide attempts, no h/o self-injurious behaviors, no h/o physical aggression     Past Medication Trials: Tenex (ineffective), Adderall (emotional and tearful, appetite suppression and insomnia), Strattera 25 mg (limited benefit, ineffective, more depressed), Concerta up to 36 mg daily (ineffective).  Focalin (effective), Intuniv (effective)     Therapist/Counseling Services: never been in therapy  Past psychiatric providers: Dr. Hamilton, Serena MÉNDEZ, Billie GUO     Family Psychiatric History:   Paternal side - depression and bipolar, anxiety     FH of Suicide - paternal cousin     Substance Abuse History: (unchanged information from previous note copied and italicized) - Information that is bolded has been updated.      Tobacco/alcohol/caffeine: Denies alcohol use, Denies tobacco use, Denies caffeine use  Illicit drugs: Denies history of illicit drug use     No past legal actions or arrests secondary to substance intoxication. The patient denies prior DWIs/DUIs. No history of outpatient/inpatient rehabilitation programs. Kathleen does not exhibit objective evidence of substance withdrawal during today's examination nor does Kathleen appear under the influence of any psychoactive substance.       Social History: (unchanged information from previous note copied and italicized) - Information that is bolded has been updated.      Developmental: Denies a history of milestone/developmental delay. Denies a history of in-utero exposure to toxins/illicit substances.  IEP since 1st grade.  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 Kathleen Guy has no history of arrests or violence with a deadly weapon.      Traumatic History: (unchanged information from previous note copied and italicized) - Information that is bolded has been updated.      Abuse: none  Other Traumatic Events: none         Past Medical History:    Past Medical History:   Diagnosis Date    Anxiety     CASTAÑEDA (dyspnea on exertion)     GERD (gastroesophageal reflux disease)     Psychiatric disorder     Tinea versicolor         Past Surgical History:   Procedure Laterality Date    DENTAL IMPLANT Left 08/2024    DENTAL SURGERY  2017    several baby teeth pulled    UPPER GASTROINTESTINAL ENDOSCOPY      WISDOM TOOTH EXTRACTION       No Known Allergies    Substance Abuse History:    Social History     Substance and Sexual Activity   Alcohol Use No     Social History     Substance and Sexual Activity   Drug Use No       Social History:    Social History     Socioeconomic History    Marital status: Single     Spouse name: Not on file    Number of children: Not on file    Years of education: Not on file    Highest education level: Not on file   Occupational History    Not on file   Tobacco Use    Smoking status: Never    Smokeless tobacco: Never   Vaping Use    Vaping status: Never Used   Substance and Sexual Activity    Alcohol use: No    Drug use: No    Sexual activity: Not on file   Other Topics Concern    Not on file   Social History Narrative    Lives with Mom & Dad- no siblings    Graduated [employer].        Finished 1 year at [employer].    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        Pets/Animals: yes birds 4    /After School Program:yes 12 th grade In person    Carbon Monoxide/Smoke detectors  in home: yes    Fire Place: no    Exposure to Mold: yes basement is not finished    Carpet in Home: yes    Stuffed Animals (Toys): yes sleeps with stuffed animals     Tobacco Use: Exposure to smoke no    E-Cigarette/Vaping: Exposure to E-Cigarette/Vaping no             Social Determinants of Health     Financial Resource Strain: Not on file   Food Insecurity: Not on file   Transportation Needs: Not on file   Physical Activity: Not on file   Stress: Not on file   Social Connections: Not on file   Intimate Partner Violence: Not on file   Housing Stability: Not on file       Family Psychiatric History:     Family History   Problem Relation Age of Onset    No Known Problems Mother     No Known Problems Father     Cancer Maternal Grandmother     Colon cancer Maternal Grandmother     Cancer Maternal Grandfather     Diabetes Maternal Grandfather     Hypertension Maternal Grandfather     Thyroid disease Maternal Grandfather     Other Maternal Grandfather         Thyroid Disorder    Dementia Maternal Grandfather     Hypertension Paternal Grandfather     Heart disease Paternal Grandfather     Osteoporosis Paternal Grandfather     Stroke Paternal Grandfather     Coronary artery disease Paternal Grandfather     Migraines Neg Hx     Learning disabilities Neg Hx          Objective      Vital signs in last 24 hours:    There were no vitals filed for this visit.    Mental Status Evaluation:    Appearance age appropriate, casually dressed   Behavior cooperative, calm   Speech normal rate, normal volume, normal pitch   Mood euthymic   Affect normal range and intensity, appropriate   Thought Processes organized, goal directed   Associations intact associations   Thought Content no overt delusions   Perceptual Disturbances: no auditory hallucinations, no visual hallucinations   Abnormal Thoughts  Risk Potential Suicidal ideation - None  Homicidal ideation - None  Potential for aggression - No   Orientation oriented to: person, place,  time/date, and situation   Memory recent and remote memory grossly intact   Consciousness alert and awake   Attention Span Concentration Span attention span and concentration are age appropriate   Intellect appears to be of average intelligence   Insight intact   Judgement intact   Muscle Strength and  Gait normal muscle strength and normal muscle tone, normal gait and normal balance   Motor activity no abnormal movements   Language no difficulty naming common objects, no difficulty repeating a phrase   Fund of Knowledge adequate knowledge of current events  adequate fund of knowledge regarding past history  adequate fund of knowledge regarding vocabulary    Pain none   Pain Scale 0       Laboratory Results: I have personally reviewed all pertinent laboratory/tests results            History Review:    The following portion of the patient's history were reviewed and updated as appropriate: Allergies, current medications, past family history, past medical history, past social history, past surgical history and problem list.    Lethality Statement:    Based on today's assessment and clinical criteria, Kathleen contracts for safety and is not an imminent risk of harm to self or others. Outpatient level of care is deemed appropriate at this current time. She understands that if they can no longer contract for safety, they need to call the office or report to their nearest Emergency Room for immediate evaluation.  The following portion of the patient's history were reviewed and updated as appropriate: Allergies, current medications, past family history, past medical history, past social history, past surgical history and problem list.           Today's Plan/Medical Decision Making:      Psychopharmacologically, mood stable, but ADHD symptoms are not controled on Concerta 36 mg daily.  Did much better on Focalin XR 15 mg daily, but unable to find pharmacy with available stock.  Will increase Concerta to 54 mg daily.  Mother  to call office if she finds Focalin.     Risks/benefits/alternativies to treatment discussed, including a myriad of potential adverse medication side effects, to which Kathlene voiced understanding and consented fully to treatment.  Also, patient is amenable to calling/contacting the outpatient office including this writer if any acute adverse effects of their medication regimen arise in addition to any comments or concerns pertaining to their psychiatric management.         Plan:  Increase Concerta to 54 mg daily for ADHD  Will attempt to switch back to Focalin XR 15 mg daily in the future.   Continue Lexapro 20 mg daily for anxiety  Continue guanfacine ER 2 mg daily at bedtime for ADHD  Psychotherapy- placed on therapy wait list.   Follow up with primary care provider for ongoing medical care  Follow up with this provider in 3 months       - Psychoeducation provided regarding the importance of exercise and health dietary choices and their impact on mood, energy, and motivation.  - Counseled to avoid ETOH, illicit substances, and nicotine secondary to the detrimental effects of these substances on mental and physical health.  - Encouraged to engage in non-verbal forms of therapy such as art therapy, music therapy, and mindfulness.   Aware of 24 hour and weekend coverage for urgent situations accessed by calling [facility] main practice number    Medications Risks/Benefits      Risks, Benefits And Possible Side Effects Of Medications:    Risks, benefits, and possible side effects of medications explained to Kathleen including risk of suicidality and serotonin syndrome related to treatment with antidepressants and risks of cardiovascular side effects including elevated blood pressure, risk of misuse, abuse or dependence and risk of increased anxiety related to treatment with stimulant medications. She verbalizes understanding and agreement for treatment.    Controlled Medication Discussion:      Discussed with Kathleen Black Box warning on concurrent use of benzodiazepines and opioid medications including sedation, respiratory depression, coma and death. She understands the risk of treatment with benzodiazepines in addition to opioids and wants to continue taking those medications    Psychotherapy Provided:     Individual psychotherapy provided: No  Medication education provided to Kathleen  Goals discussed during session     Treatment Plan:    Completed and signed during the session: Not applicable - Treatment Plan not due at this session           Assessment & Plan  Attention deficit hyperactivity disorder (ADHD), predominantly inattentive type  Treatment status: not at goal  Treatment considerations: Focalin XR 15 mg not available  Treatment Plan: increase to Concerta 54, switch back to Focalin XR when available  Orders:    methylphenidate (CONCERTA) 54 MG ER tablet; Take 1 tablet (54 mg total) by mouth daily Max Daily Amount: 54 mg              Visit Time    Visit Start Time: 5:00 PM\  Visit Stop Time: 5:30 PM  Total Visit Duration:  30 minutes    SARI Carlson 09/19/24    This note was completed in part utilizing [employer]. Grammatical, translation, syntax errors, random word insertions, spelling mistakes, and incomplete sentences may be an occasional consequence of this system secondary to software limitations with voice recognition, ambient noise, and hardware issues. If you have any questions or concerns about the content, text, or information contained within the body of this dictation, please contact the provider for clarification.

## 2024-09-22 ENCOUNTER — PATIENT MESSAGE (OUTPATIENT)
Dept: FAMILY MEDICINE CLINIC | Facility: CLINIC | Age: 19
End: 2024-09-22

## 2024-09-23 NOTE — PATIENT COMMUNICATION
"Patient called back and provider's comment shared:  \"Unfortunately I cannot provide any advice without examining the patient.  She should be scheduled with any available physician/nurse practitioner if her symptoms persist.  Thank you\"  Patient said she thinks her ear is getting better. I offered to make appointment but she declined at this time. She will call back if OV needed.  "

## 2024-09-25 ENCOUNTER — NURSE TRIAGE (OUTPATIENT)
Dept: OTHER | Facility: OTHER | Age: 19
End: 2024-09-25

## 2024-09-26 ENCOUNTER — OFFICE VISIT (OUTPATIENT)
Dept: FAMILY MEDICINE CLINIC | Facility: CLINIC | Age: 19
End: 2024-09-26
Payer: COMMERCIAL

## 2024-09-26 VITALS
DIASTOLIC BLOOD PRESSURE: 70 MMHG | OXYGEN SATURATION: 97 % | WEIGHT: 142 LBS | BODY MASS INDEX: 21.52 KG/M2 | RESPIRATION RATE: 16 BRPM | HEIGHT: 68 IN | HEART RATE: 89 BPM | TEMPERATURE: 98.2 F | SYSTOLIC BLOOD PRESSURE: 100 MMHG

## 2024-09-26 DIAGNOSIS — H61.21 IMPACTED CERUMEN OF RIGHT EAR: Primary | ICD-10-CM

## 2024-09-26 PROCEDURE — 99213 OFFICE O/P EST LOW 20 MIN: CPT | Performed by: FAMILY MEDICINE

## 2024-09-26 PROCEDURE — 69209 REMOVE IMPACTED EAR WAX UNI: CPT | Performed by: FAMILY MEDICINE

## 2024-09-26 NOTE — TELEPHONE ENCOUNTER
"Reason for Disposition  • Ear congestion    Answer Assessment - Initial Assessment Questions  1. LOCATION: \"Which ear is involved?\"        Rt ear  2. SENSATION: \"Describe how the ear feels.\" (e.g., stuffy, full, plugged).\"       Feels like a tunnel  3. ONSET:  \"When did the ear symptoms start?\"        Started two days ago  4. PAIN: \"Do you also have an earache?\" If Yes, ask: \"How bad is it?\" (Scale 0-10; none, mild, moderate or severe)      No pain   5. CAUSE: \"What do you think is causing the ear congestion?\" (e.g., common cold, nasal allergies, recent flight, recent snorkeling)      Unsure may have gotten water trapped in ear during the bath.  6. OTHER SYMPTOMS: \"Do you have any other symptoms?\" (e.g., ear drainage, hay fever symptoms such as sneezing or a clear nasal discharge; cold symptoms such as a cough or runny nose)      Was cleaning ears with q tips.  7. PREGNANCY: \"Is there any chance you are pregnant?\" \"When was your last menstrual period?\"      no    Protocols used: Ear - Congestion-Adult-AH    "

## 2024-09-26 NOTE — TELEPHONE ENCOUNTER
"Regarding: concern/ right ear  ----- Message from Alejandra FLORES sent at 9/25/2024  9:54 PM EDT -----  Patient called, \" I having concerns with my right ear.I am not sure what to do.\"    "

## 2024-09-26 NOTE — PROGRESS NOTES
"Ambulatory Visit  Name: Kathleen Guy      : 2005      MRN: [id number]  Encounter Provider: Arabella Aguilar DO  Encounter Date: 2024   Encounter department: [facility]    Assessment & Plan  Impacted cerumen of right ear  Tolerated irrigation well, TM normal appearing, hearing improved. Can use Debrox prn, avoid instrumentation. Follow up as needed.          History of Present Illness     Ear Fullness       Was cleaning her R ear with Q tips, then woke up the next day with muffled hearing.       Review of Systems        Objective     /70   Pulse 89   Temp 98.2 °F (36.8 °C) (Temporal)   Resp 16   Ht 5' 8\" (1.727 m)   Wt 64.4 kg (142 lb)   LMP 2024 (Approximate)   SpO2 97%   BMI 21.59 kg/m²     Physical Exam  Ear cerumen removal    Date/Time: 2024 3:40 PM    Performed by: Arabella Aguilar DO  Authorized by: Arabella Aguilar DO  Universal Protocol:  Patient understanding: patient states understanding of the procedure being performed    Patient location:  Clinic  Procedure details:     Location:  R ear    Procedure type: irrigation only    Post-procedure details:     Complication:  None    Hearing quality:  Normal    Patient tolerance of procedure:  Tolerated well, no immediate complications        "

## 2024-10-16 ENCOUNTER — OFFICE VISIT (OUTPATIENT)
Dept: FAMILY MEDICINE CLINIC | Facility: CLINIC | Age: 19
End: 2024-10-16
Payer: COMMERCIAL

## 2024-10-16 ENCOUNTER — NURSE TRIAGE (OUTPATIENT)
Dept: OTHER | Facility: OTHER | Age: 19
End: 2024-10-16

## 2024-10-16 VITALS
TEMPERATURE: 98 F | OXYGEN SATURATION: 97 % | HEIGHT: 68 IN | RESPIRATION RATE: 16 BRPM | HEART RATE: 86 BPM | BODY MASS INDEX: 22.1 KG/M2 | SYSTOLIC BLOOD PRESSURE: 130 MMHG | WEIGHT: 145.8 LBS | DIASTOLIC BLOOD PRESSURE: 80 MMHG

## 2024-10-16 DIAGNOSIS — H61.21 IMPACTED CERUMEN OF RIGHT EAR: Primary | ICD-10-CM

## 2024-10-16 PROCEDURE — 99213 OFFICE O/P EST LOW 20 MIN: CPT | Performed by: FAMILY MEDICINE

## 2024-10-16 NOTE — PROGRESS NOTES
"Chief Complaint   Patient presents with    Cerumen Impacted     Right ear, patient states she was using Q-tips         HPI   Here because her right ear is blocked.  Does use Q-tips.    Past Medical History:   Diagnosis Date    Anxiety     CASTAÑEDA (dyspnea on exertion)     GERD (gastroesophageal reflux disease)     Psychiatric disorder     Tinea versicolor         Past Surgical History:   Procedure Laterality Date    DENTAL IMPLANT Left 08/2024    DENTAL SURGERY  2017    several baby teeth pulled    UPPER GASTROINTESTINAL ENDOSCOPY      WISDOM TOOTH EXTRACTION         Social History     Tobacco Use    Smoking status: Never    Smokeless tobacco: Never   Substance Use Topics    Alcohol use: No       Social History     Social History Narrative    Lives with Mom & Dad- no siblings    2nd year at [employer].    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        Pets/Animals: yes birds 4    /After School Program:yes 12 th grade In person    Carbon Monoxide/Smoke detectors in home: yes    Fire Place: no    Exposure to Mold: yes basement is not finished    Carpet in Home: yes    Stuffed Animals (Toys): yes sleeps with stuffed animals     Tobacco Use: Exposure to smoke no    E-Cigarette/Vaping: Exposure to E-Cigarette/Vaping no                The following portions of the patient's history were reviewed and updated as appropriate: allergies, current medications, past family history, past medical history, past social history, past surgical history, and problem list.      Review of Systems       /80 (BP Location: Left arm, Patient Position: Sitting, Cuff Size: Standard)   Pulse 86   Temp 98 °F (36.7 °C) (Temporal)   Resp 16   Ht 5' 8\" (1.727 m)   Wt 66.1 kg (145 lb 12.8 oz)   LMP 09/21/2024 (Approximate)   SpO2 97%   BMI 22.17 kg/m²      Physical Exam   The left ear canal is patent with a little bit of wax.  The right ear canal was blocked with some white debris.    Ear cerumen removal    Date/Time: " 10/16/2024 12:40 PM    Performed by: Eduar Tadeo MD  Authorized by: Eduar Tadeo MD  Universal Protocol:  Procedure performed by:    Patient location:  Clinic  Comments:      The right ear canal was irrigated 1 time and a small amount of white debris was removed.  Hearing improved.                  Current Outpatient Medications:     amoxicillin (AMOXIL) 875 mg tablet, Take 875 mg by mouth 2 (two) times a day, Disp: , Rfl:     escitalopram (LEXAPRO) 20 mg tablet, Take 1 tablet (20 mg total) by mouth daily, Disp: 90 tablet, Rfl: 1    guanFACINE HCl ER (INTUNIV) 2 MG TB24, Take 1 tablet (2 mg total) by mouth daily at bedtime, Disp: 90 tablet, Rfl: 1    methylphenidate (CONCERTA) 54 MG ER tablet, Take 1 tablet (54 mg total) by mouth daily Max Daily Amount: 54 mg, Disp: 30 tablet, Rfl: 0    methylPREDNISolone 4 MG tablet therapy pack, Use as directed on package, Disp: 21 each, Rfl: 0    Multiple Vitamin (MULTI-VITAMIN DAILY PO), Take by mouth, Disp: , Rfl:     omeprazole (PriLOSEC) 40 MG capsule, Take 1 capsule (40 mg total) by mouth daily, Disp: 90 capsule, Rfl: 1    polyethylene glycol (MIRALAX) 17 g packet, Take 17 g by mouth daily, Disp: 510 g, Rfl: 5    Sodium Fluoride 5000 PPM 1.1 % PSTE, Use as directed, Disp: , Rfl:      No problem-specific Assessment & Plan notes found for this encounter.       Diagnoses and all orders for this visit:    Impacted cerumen of right ear    Other orders  -     Ear cerumen removal        Patient Instructions   Debris removed from the right ear canal.  Suggest not using Q-tips.  Return as needed.

## 2024-10-16 NOTE — TELEPHONE ENCOUNTER
"Reason for Disposition   Ear congestion    Answer Assessment - Initial Assessment Questions  1. LOCATION: \"Which ear is involved?\"        Ear right    2. SENSATION: \"Describe how the ear feels.\" (e.g., stuffy, full, plugged).\"       Clogged. Feels like cotton in the ear    3. ONSET:  \"When did the ear symptoms start?\"        Yesterday     4. PAIN: \"Do you also have an earache?\" If Yes, ask: \"How bad is it?\" (Scale 0-10; none, mild, moderate or severe)      Denies pain-but uncomfortable.     5. CAUSE: \"What do you think is causing the ear congestion?\" (e.g., common cold, nasal allergies, recent flight, recent snorkeling)      Had water in the ear and then put cotton swab in there    6. OTHER SYMPTOMS: \"Do you have any other symptoms?\" (e.g., ear drainage, hay fever symptoms such as sneezing or a clear nasal discharge; cold symptoms such as a cough or runny nose)      Denies    7. PREGNANCY: \"Is there any chance you are pregnant?\" \"When was your last menstrual period?\"      Denies    Tried irrigating ear with water to make it feel better but it did not. Ear feels clogged and uncomfortable.     Scheduled an appointment per request.    Protocols used: Ear - Congestion-Adult-    "

## 2024-10-18 DIAGNOSIS — F90.0 ATTENTION DEFICIT HYPERACTIVITY DISORDER (ADHD), PREDOMINANTLY INATTENTIVE TYPE: ICD-10-CM

## 2024-10-18 NOTE — TELEPHONE ENCOUNTER
09/21/2024 09/19/2024 Methylphenidate Hcl (Tablet, Extended Release) 30.0 30 54 MG AISHWARYA WINKLER [id number] Commercial Insurance 0 / 0 [place]   1 [id number] 08/26/2024 08/26/2024 Methylphenidate Hcl (Tablet, Extended Release) 30.0 30 36 MG NA EVANGELINA WINKLER [id number] Commercial Insurance 0 / 0 [place]   1 [id number] 07/29/2024 07/24/2024 Dexmethylphenidate Hcl (Capsule, Extended Release) 30.0 30 15 MG NA EVANGELINA WINKLER [id number] Commercial Insurance 0 / 0 PA   1 [id number] 06/29/2024 05/30/2024 Dexmethylphenidate Hcl (Capsule, Extended Release) 30.0 30 15 MG AISHWARYA WINKLER [id number] Commercial Insurance 0 / 0 PA   1 [id number] 05/30/2024 05/30/2024 Dexmethylphenidate Hcl (Capsule, Extended Release) 30.0 30 15 MG AISHWARYA WINKLER [id number] Commercial Insurance 0 / 0 PA   1 [id number] 04/26/2024 04/26/2024 Dexmethylphenidate Hcl (Capsule, Extended Release) 30.0 30 15 MG AISHWARYA WINKLER [id number] Commercial Insurance 0 / 0 PA   1 [id number] 03/27/2024 03/27/2024 Dexmethylphenidate Hcl (Capsule, Extended Release) 30.0 30 15 MG AISHWARYA WINKLER [id number] Commercial Insurance 0 / 0 PA   1 [id number] 03/01/2024 03/01/2024 Dexmethylphenidate Hcl (Capsule, Extended Release) 30.0 30 15 MG AISHWARYA WINKLER [id number] Commercial Insurance 0 / 0 PA   1 [id number] 01/31/2024 01/25/2024 Dexmethylphenidate Hcl (Capsule, Extended Release) 30.0 30 15 MG NA EVANGELINA WINKLER [facility]. Commercial Insurance 0 / 0

## 2024-10-18 NOTE — TELEPHONE ENCOUNTER
Reason for call:   [x] Refill   [] Prior Auth  [] Other:     Office:   [] PCP/Provider -   [x] Specialty/Provider - [facility]  Authorized By: SARI Carlson    Medication: methylphenidate (CONCERTA) 54 MG ER tablet     Dose/Frequency:  Take 1 tablet (54 mg total) by mouth daily     Quantity: 30 tablet     Pharmacy: [address]     Does the patient have enough for 3 days?   [x] Yes   [] No - Send as HP to POD

## 2024-10-20 RX ORDER — METHYLPHENIDATE HYDROCHLORIDE 54 MG/1
54 TABLET ORAL DAILY
Qty: 30 TABLET | Refills: 0 | Status: SHIPPED | OUTPATIENT
Start: 2024-10-20

## 2024-10-28 DIAGNOSIS — K21.9 GASTROESOPHAGEAL REFLUX DISEASE WITHOUT ESOPHAGITIS: ICD-10-CM

## 2024-10-28 DIAGNOSIS — R10.13 EPIGASTRIC PAIN: ICD-10-CM

## 2024-10-28 DIAGNOSIS — K59.09 CHRONIC CONSTIPATION: ICD-10-CM

## 2024-10-29 RX ORDER — POLYETHYLENE GLYCOL 3350 17 G/17G
POWDER, FOR SOLUTION ORAL
Qty: 510 G | Refills: 0 | Status: SHIPPED | OUTPATIENT
Start: 2024-10-29

## 2024-11-03 ENCOUNTER — NURSE TRIAGE (OUTPATIENT)
Dept: OTHER | Facility: OTHER | Age: 19
End: 2024-11-03

## 2024-11-04 ENCOUNTER — OFFICE VISIT (OUTPATIENT)
Dept: FAMILY MEDICINE CLINIC | Facility: CLINIC | Age: 19
End: 2024-11-04
Payer: COMMERCIAL

## 2024-11-04 VITALS
OXYGEN SATURATION: 98 % | SYSTOLIC BLOOD PRESSURE: 122 MMHG | HEART RATE: 80 BPM | TEMPERATURE: 98.5 F | DIASTOLIC BLOOD PRESSURE: 70 MMHG | WEIGHT: 145 LBS | RESPIRATION RATE: 16 BRPM | BODY MASS INDEX: 21.98 KG/M2 | HEIGHT: 68 IN

## 2024-11-04 DIAGNOSIS — H61.21 IMPACTED CERUMEN OF RIGHT EAR: Primary | ICD-10-CM

## 2024-11-04 DIAGNOSIS — L01.00 IMPETIGO: ICD-10-CM

## 2024-11-04 PROCEDURE — 99214 OFFICE O/P EST MOD 30 MIN: CPT | Performed by: FAMILY MEDICINE

## 2024-11-04 RX ORDER — MUPIROCIN 20 MG/G
OINTMENT TOPICAL 3 TIMES DAILY
Qty: 30 G | Refills: 0 | Status: SHIPPED | OUTPATIENT
Start: 2024-11-04

## 2024-11-04 NOTE — PROGRESS NOTES
"Ambulatory Visit  Name: Kathleen Guy      : 2005      MRN: [id number]  Encounter Provider: Arabella Aguilar DO  Encounter Date: 2024   Encounter department: [facility]    Assessment & Plan  Impacted cerumen of right ear  Recommend trial of Debrox and follow up if not improved.       Impetigo  Started as pimples between the eyebrows, now crusted lesions. Start Bactroban TID and follow up if not improved. Avoid picking. Gentle face wash only.  Orders:    mupirocin (BACTROBAN) 2 % ointment; Apply topically 3 (three) times a day       History of Present Illness     HPI    Used a syringe to flush L ear last week, hearing improved, felt dizzy after. Next day felt very off balance, two days in a row. Woke up this morning feeling off.     When she feels dizzy, feels off balance, improves with sitting still for a short time. At longest it lasts for one hour. No room spinning. Can happen once a day at most. Resolves spontaneously or after sitting still. Feels thirsty, not sure if drinking enough water.     Has some crusting pimples on the forehead that are slightly painful. Using Aveeno body was on her face. Started as pimples that she popped and now scabs won't resolve.      Review of Systems        Objective     /70 (BP Location: Left arm, Patient Position: Sitting, Cuff Size: Standard)   Pulse 80   Temp 98.5 °F (36.9 °C) (Temporal)   Resp 16   Ht 5' 8\" (1.727 m)   Wt 65.8 kg (145 lb)   LMP  (LMP Unknown)   SpO2 98%   BMI 22.05 kg/m²     Physical Exam  Vitals reviewed.   Constitutional:       Appearance: Normal appearance.   HENT:      Right Ear: Ear canal and external ear normal. There is impacted cerumen.      Left Ear: Tympanic membrane, ear canal and external ear normal. There is no impacted cerumen.   Eyes:      Pupils: Pupils are equal, round, and reactive to light.   Cardiovascular:      Rate and Rhythm: Normal rate.   Skin:     General: Skin is warm and dry.     "  Comments: Crusting lesion between the eyebrows   Neurological:      Mental Status: She is alert.   Psychiatric:         Mood and Affect: Mood normal.         Behavior: Behavior normal.

## 2024-11-04 NOTE — TELEPHONE ENCOUNTER
"Regarding: dizzy  ----- Message from Ame SHELBY sent at 11/3/2024  9:31 PM EST -----  \"I keep on having dizzy spells. It takes about 30 minutes to go away. It has been happening for the last few days. I feel of balanced.\"    "

## 2024-11-04 NOTE — TELEPHONE ENCOUNTER
"Reason for Disposition  • [1] MODERATE dizziness (e.g., vertigo; feels very unsteady, interferes with normal activities) AND [2] has NOT been evaluated by doctor (or NP/PA) for this    Answer Assessment - Initial Assessment Questions  1. DESCRIPTION: \"Describe your dizziness.\"      Feels off balance and feel like she is going to fall on her side    2. VERTIGO: \"Do you feel like either you or the room is spinning or tilting?\"       Denies    3. LIGHTHEADED: \"Do you feel lightheaded?\" (e.g., somewhat faint, woozy, weak upon standing)      Denies    4. SEVERITY: \"How bad is it?\"  \"Can you walk?\"      Can walk around, just feels funny and off balanced    5. ONSET:  \"When did the dizziness begin?\"      Thursday    6. AGGRAVATING FACTORS: \"Does anything make it worse?\" (e.g., standing, change in head position)      Standing and walking    7. CAUSE: \"What do you think is causing the dizziness?\"      Ear irrigation used it on Thursday     8. RECURRENT SYMPTOM: \"Have you had dizziness before?\" If Yes, ask: \"When was the last time?\" \"What happened that time?\"      Started on Thursday     9. OTHER SYMPTOMS: \"Do you have any other symptoms?\" (e.g., earache, headache, numbness, tinnitus, vomiting, weakness)      Headache yesterday    10. PREGNANCY: \"Is there any chance you are pregnant?\" \"When was your last menstrual period?\"        Denies    Dizziness comes and goes today. Happening every once in awhile today. Dizziness started on Thursday after using ear irrigation. Patient is able to walk but feels as if she will fall over. States hearing is better but had to get water out of her ear while in the bathtub. No other symptoms. Scheduled an appointment for tomorrow as per protocol.    Protocols used: Dizziness - Vertigo-Adult-    "

## 2024-11-10 ENCOUNTER — PATIENT MESSAGE (OUTPATIENT)
Dept: FAMILY MEDICINE CLINIC | Facility: CLINIC | Age: 19
End: 2024-11-10

## 2024-11-19 ENCOUNTER — TELEPHONE (OUTPATIENT)
Age: 19
End: 2024-11-19

## 2024-11-19 DIAGNOSIS — F90.0 ATTENTION DEFICIT HYPERACTIVITY DISORDER (ADHD), PREDOMINANTLY INATTENTIVE TYPE: Primary | ICD-10-CM

## 2024-11-19 DIAGNOSIS — F90.0 ATTENTION DEFICIT HYPERACTIVITY DISORDER (ADHD), PREDOMINANTLY INATTENTIVE TYPE: ICD-10-CM

## 2024-11-19 RX ORDER — METHYLPHENIDATE HYDROCHLORIDE 54 MG/1
54 TABLET ORAL DAILY
Qty: 30 TABLET | Refills: 0 | Status: SHIPPED | OUTPATIENT
Start: 2024-11-19 | End: 2024-11-19 | Stop reason: DRUGHIGH

## 2024-11-19 RX ORDER — METHYLPHENIDATE HYDROCHLORIDE 27 MG/1
54 TABLET ORAL DAILY
Qty: 60 TABLET | Refills: 0 | Status: SHIPPED | OUTPATIENT
Start: 2024-11-19

## 2024-11-19 NOTE — TELEPHONE ENCOUNTER
Prescription sent to [facility] pharmacy for Concerta 27 mg (2 tablets, total dose 54 mg daily) as requested.  Please advise patient.

## 2024-11-19 NOTE — TELEPHONE ENCOUNTER
Refill cannot be delegated    1 [id number] 10/20/2024 10/20/2024 Methylphenidate Hcl (Tablet, Extended Release) 30.0 30 54 MG NA EVANGELINA WINKLER [id number] Commercial Insurance 0 / 0 [place]   1 [id number] 09/21/2024 09/19/2024 Methylphenidate Hcl (Tablet, Extended Release) 30.0 30 54 MG AISHWARYA WINKLER [id number] Commercial Insurance 0 / 0 PA   1 [id number] 08/26/2024 08/26/2024 Methylphenidate Hcl (Tablet, Extended Release) 30.0 30 36 MG AISHWARYA WINKLER [id number] Commercial Insurance 0 / 0 PA   1 [id number] 07/29/2024 07/24/2024 Dexmethylphenidate Hcl (Capsule, Extended Release) 30.0 30 15 MG AISHWARYA WINKLER [id number] Commercial Insurance 0 / 0 PA   1 [id number] 06/29/2024 05/30/2024 Dexmethylphenidate Hcl (Capsule, Extended Release) 30.0 30 15 MG AISHWARYA WINKLER [id number] Commercial Insurance 0 / 0 PA   1 [id number] 05/30/2024 05/30/2024 Dexmethylphenidate Hcl (Capsule, Extended Release) 30.0 30 15 MG AISHWARYA WINKLER [id number] Commercial Insurance 0 / 0 PA   1 [id number] 04/26/2024 04/26/2024 Dexmethylphenidate Hcl (Capsule, Extended Release) 30.0 30 15 MG AISHWARYA WINKLER [id number] Commercial Insurance 0 / 0 PA   1 [id number] 03/27/2024 03/27/2024 Dexmethylphenidate Hcl (Capsule, Extended Release) 30.0 30 15 MG AISHWARYA WINKLER [id number] Commercial Insurance 0 / 0 PA   1 [id number] 03/01/2024 03/01/2024 Dexmethylphenidate Hcl (Capsule, Extended Release) 30.0 30 15 MG AISHWARYA WINKLER [id number] Commercial Insurance 0 / 0 PA   1 [id number] 01/31/2024 01/25/2024 Dexmethylphenidate Hcl (Capsule, Extended Release) 30.0 30 15 MG NA EVANGELINA WINKLER [facility] Commercial Insurance 0 / 0 PA

## 2024-11-19 NOTE — TELEPHONE ENCOUNTER
[facility] called in regarding medication Concerta 54mg.     Pharmacy informed writer medication is back order but Concerta 27 mg / 2 daily - is available and is covered by insurance.    Writer informed pharmacist msg will be relay to provider.

## 2024-11-19 NOTE — TELEPHONE ENCOUNTER
Reason for call:   [x] Refill   [] Prior Auth  [] Other:     Office:   [] PCP/Provider -   [x] Specialty/Provider - Psych/Nessa Mckeon     Medication: methylphenidate (CONCERTA) 54 MG ER tablet     Dose/Frequency: 1 tablet daily    Quantity: 30    Pharmacy: [facility]     Does the patient have enough for 3 days?   [] Yes   [x] No - Send as HP to POD

## 2024-11-27 ENCOUNTER — TELEPHONE (OUTPATIENT)
Age: 19
End: 2024-11-27

## 2024-11-27 DIAGNOSIS — R11.10 VOMITING, UNSPECIFIED VOMITING TYPE, UNSPECIFIED WHETHER NAUSEA PRESENT: Primary | ICD-10-CM

## 2024-11-27 RX ORDER — ONDANSETRON 4 MG/1
4 TABLET, ORALLY DISINTEGRATING ORAL EVERY 6 HOURS PRN
Qty: 20 TABLET | Refills: 0 | Status: SHIPPED | OUTPATIENT
Start: 2024-11-27

## 2024-11-27 NOTE — TELEPHONE ENCOUNTER
Please advise to drink Gatorade, flat ginger ale, she may try saltines, crackers, pretzels, applesauce and bananas.  I will send prescription for PRN Zofran that should help with nausea vomiting.  If her symptoms will not improve-she should proceed to urgent care center or emergency room for further evaluation.  Thank you

## 2024-11-27 NOTE — TELEPHONE ENCOUNTER
Patient called she is having diarrhea, nausea and vomiting since this morning.  She said she thinks it may be a stomach bug because her mom and day had the same symptoms.  No available appointments in office today.  She asked what she can take to help symptoms.    Thank you

## 2024-12-05 DIAGNOSIS — K59.09 CHRONIC CONSTIPATION: ICD-10-CM

## 2024-12-05 DIAGNOSIS — R10.13 EPIGASTRIC PAIN: ICD-10-CM

## 2024-12-05 DIAGNOSIS — K21.9 GASTROESOPHAGEAL REFLUX DISEASE WITHOUT ESOPHAGITIS: ICD-10-CM

## 2024-12-05 RX ORDER — POLYETHYLENE GLYCOL 3350 17 G/17G
510 POWDER, FOR SOLUTION ORAL DAILY
Qty: 510 G | Refills: 0 | Status: SHIPPED | OUTPATIENT
Start: 2024-12-05

## 2024-12-26 ENCOUNTER — OFFICE VISIT (OUTPATIENT)
Dept: PSYCHIATRY | Facility: CLINIC | Age: 19
End: 2024-12-26
Payer: COMMERCIAL

## 2024-12-26 DIAGNOSIS — F90.0 ATTENTION DEFICIT HYPERACTIVITY DISORDER (ADHD), PREDOMINANTLY INATTENTIVE TYPE: Primary | ICD-10-CM

## 2024-12-26 DIAGNOSIS — F40.10 SOCIAL ANXIETY DISORDER: Chronic | ICD-10-CM

## 2024-12-26 PROCEDURE — 99213 OFFICE O/P EST LOW 20 MIN: CPT | Performed by: NURSE PRACTITIONER

## 2024-12-26 RX ORDER — METHYLPHENIDATE HYDROCHLORIDE 27 MG/1
54 TABLET ORAL DAILY
Qty: 60 TABLET | Refills: 0 | Status: SHIPPED | OUTPATIENT
Start: 2024-12-26

## 2024-12-26 RX ORDER — METHYLPHENIDATE HYDROCHLORIDE 27 MG/1
54 TABLET ORAL DAILY
Qty: 60 TABLET | Refills: 0 | Status: SHIPPED | OUTPATIENT
Start: 2025-01-25
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
## 2024-12-26 NOTE — ASSESSMENT & PLAN NOTE
Orders:    methylphenidate (CONCERTA) 27 MG ER tablet; Take 2 tablets (54 mg total) by mouth daily Max Daily Amount: 54 mg    methylphenidate (CONCERTA) 27 MG ER tablet; Take 2 tablets (54 mg total) by mouth daily Max Daily Amount: 54 mg Do not start before January 25, 2025.

## 2024-12-30 ENCOUNTER — OFFICE VISIT (OUTPATIENT)
Dept: FAMILY MEDICINE CLINIC | Facility: CLINIC | Age: 19
End: 2024-12-30
Payer: COMMERCIAL

## 2024-12-30 VITALS
TEMPERATURE: 97.8 F | HEART RATE: 68 BPM | OXYGEN SATURATION: 99 % | DIASTOLIC BLOOD PRESSURE: 76 MMHG | SYSTOLIC BLOOD PRESSURE: 120 MMHG | RESPIRATION RATE: 16 BRPM | HEIGHT: 68 IN | WEIGHT: 141.8 LBS | BODY MASS INDEX: 21.49 KG/M2

## 2024-12-30 DIAGNOSIS — F40.10 SOCIAL ANXIETY DISORDER: Chronic | ICD-10-CM

## 2024-12-30 DIAGNOSIS — Z23 NEED FOR INFLUENZA VACCINATION: ICD-10-CM

## 2024-12-30 DIAGNOSIS — Z00.00 ENCOUNTER FOR WELLNESS EXAMINATION IN ADULT: Primary | ICD-10-CM

## 2024-12-30 DIAGNOSIS — L01.00 IMPETIGO: ICD-10-CM

## 2024-12-30 DIAGNOSIS — K13.0 ANGULAR STOMATITIS: ICD-10-CM

## 2024-12-30 DIAGNOSIS — F90.0 ATTENTION DEFICIT HYPERACTIVITY DISORDER (ADHD), PREDOMINANTLY INATTENTIVE TYPE: ICD-10-CM

## 2024-12-30 PROCEDURE — 99395 PREV VISIT EST AGE 18-39: CPT | Performed by: FAMILY MEDICINE

## 2024-12-30 PROCEDURE — 90656 IIV3 VACC NO PRSV 0.5 ML IM: CPT

## 2024-12-30 PROCEDURE — 90471 IMMUNIZATION ADMIN: CPT

## 2024-12-30 RX ORDER — MUPIROCIN 20 MG/G
OINTMENT TOPICAL 3 TIMES DAILY
Start: 2024-12-30

## 2024-12-30 RX ORDER — CLOTRIMAZOLE 1 %
CREAM (GRAM) TOPICAL 2 TIMES DAILY
Qty: 60 G | Refills: 0 | Status: SHIPPED | OUTPATIENT
Start: 2024-12-30

## 2024-12-30 NOTE — PROGRESS NOTES
Name: Kathleen Guy      : 2005      MRN: [id number]  Encounter Provider: Kristin Mora MD  Encounter Date: 2024   Encounter department: [facility]    Assessment & Plan  Encounter for wellness examination in adult  Recommend GYN exam after 20st birthday  Flu vaccine was administered today       Need for influenza vaccination    Orders:  •  Fluzone 0.5 mL IM    Impetigo    Orders:  •  mupirocin (BACTROBAN) 2 % ointment; Apply topically 3 (three) times a day    Angular stomatitis    Orders:  •  clotrimazole (LOTRIMIN) 1 % cream; Apply topically 2 (two) times a day    Attention deficit hyperactivity disorder (ADHD), predominantly inattentive type  The patient is on Concerta, current dose is 54 mg daily.  She is under care of [facility]       Social anxiety disorder  Continue Lexapro 20 mg daily.            History of Present Illness     Patient presents for annual well exam.  She is here today accompanied by her mother.  Medications updated.  Patient is under care of [facility].  Apparently CRNP that has been following up her treatment is leaving the practice send patient/mother worried about ongoing med refills.  She remains on regimen of Concerta and Lexapro for treatment of ADHD and anxiety.    Menses are regular, every months, she flows 5 days, heavy first 1 to 2 days.  Occasional cramping, ibuprofen helps.      Patient is complaining of angular stomatitis of corner of her lips, especially occurring wintertime menses are every  month         Review of Systems   Constitutional: Negative.    HENT: Negative.     Eyes: Negative.    Respiratory: Negative.     Cardiovascular: Negative.    Gastrointestinal: Negative.    Endocrine: Negative.    Genitourinary: Negative.    Musculoskeletal: Negative.    Skin:  Positive for rash.   Allergic/Immunologic: Negative.    Neurological: Negative.    Hematological: Negative.    Psychiatric/Behavioral:  Negative.       Past Medical History:   Diagnosis Date   • Anxiety    • CASTAÑEDA (dyspnea on exertion) 8/15/2021   • GERD (gastroesophageal reflux disease)    • Psychiatric disorder    • Tinea versicolor 12/26/2021     Past Surgical History:   Procedure Laterality Date   • DENTAL IMPLANT Left 08/2024   • DENTAL SURGERY  2017    several baby teeth pulled   • UPPER GASTROINTESTINAL ENDOSCOPY     • WISDOM TOOTH EXTRACTION       Family History   Problem Relation Age of Onset   • No Known Problems Mother    • No Known Problems Father    • Cancer Maternal Grandmother    • Colon cancer Maternal Grandmother    • Cancer Maternal Grandfather    • Diabetes Maternal Grandfather    • Hypertension Maternal Grandfather    • Thyroid disease Maternal Grandfather    • Other Maternal Grandfather         Thyroid Disorder   • Dementia Maternal Grandfather    • Hypertension Paternal Grandfather    • Heart disease Paternal Grandfather    • Osteoporosis Paternal Grandfather    • Stroke Paternal Grandfather    • Coronary artery disease Paternal Grandfather    • Migraines Neg Hx    • Learning disabilities Neg Hx      Social History     Tobacco Use   • Smoking status: Never   • Smokeless tobacco: Never   Vaping Use   • Vaping status: Never Used   Substance and Sexual Activity   • Alcohol use: No   • Drug use: No   • Sexual activity: Not on file     Current Outpatient Medications on File Prior to Visit   Medication Sig   • escitalopram (LEXAPRO) 20 mg tablet Take 1 tablet (20 mg total) by mouth daily   • guanFACINE HCl ER (INTUNIV) 2 MG TB24 Take 1 tablet (2 mg total) by mouth daily at bedtime   • methylphenidate (CONCERTA) 27 MG ER tablet Take 2 tablets (54 mg total) by mouth daily Max Daily Amount: 54 mg   • Multiple Vitamin (MULTI-VITAMIN DAILY PO) Take by mouth   • omeprazole (PriLOSEC) 40 MG capsule Take 1 capsule (40 mg total) by mouth daily   • polyethylene glycol (GLYCOLAX) 17 GM/SCOOP powder Take 510 g by mouth daily take 17 grams  "DISSOLVED IN GLASS OF LIQUID by mouth once daily, Normal   • Sodium Fluoride 5000 PPM 1.1 % PSTE Use as directed   • ondansetron (ZOFRAN-ODT) 4 mg disintegrating tablet Take 1 tablet (4 mg total) by mouth every 6 (six) hours as needed for nausea or vomiting (Patient not taking: Reported on 12/30/2024)     No Known Allergies  Immunization History   Administered Date(s) Administered   • COVID-19 PFIZER VACCINE 0.3 ML IM 04/29/2021, 05/20/2021, 12/29/2021, 12/29/2021   • DTaP 2004, 2004, 02/15/2005, 11/22/2005, 08/15/2008   • DTaP 5 2004, 2004, 02/15/2005, 11/22/2005, 08/15/2008   • HPV9 01/09/2023, 02/14/2023, 08/18/2023   • Hep B, Adolescent or Pediatric 2004, 2004, 05/24/2005   • Hep B, adult 2004, 2004, 05/24/2005   • HiB 2004, 2004, 02/15/2005, 11/22/2005   • Hib (PRP-OMP) 2004, 2004, 02/15/2005, 11/22/2005   • INFLUENZA 02/03/2006, 03/03/2006, 10/08/2015, 10/25/2016, 10/26/2017, 11/23/2018, 11/25/2019, 12/01/2020, 12/23/2021, 12/27/2022   • IPV 2004, 2004, 05/24/2005, 08/15/2008   • Influenza Quadrivalent Preservative Free 3 years and older IM 10/08/2015, 10/25/2016, 10/26/2017   • Influenza, injectable, quadrivalent, preservative free 0.5 mL 11/23/2018, 11/25/2019, 12/01/2020, 12/23/2021, 12/27/2022   • Influenza, seasonal, injectable 02/03/2006, 03/03/2006, 11/28/2009, 10/06/2014   • Influenza, seasonal, injectable, preservative free 12/30/2024   • MMR 11/22/2005, 08/15/2008   • Meningococcal MCV4, Unspecified 11/14/2015, 12/01/2020   • Meningococcal MCV4P 11/14/2015, 12/01/2020   • Meningococcal, Unknown Serogroups 11/14/2015   • Pneumococcal Conjugate PCV 7 2004, 2004, 02/15/2005, 11/22/2005   • Tdap 11/14/2015   • Varicella 08/09/2005, 08/15/2008     Objective   /76 (BP Location: Left arm, Patient Position: Sitting, Cuff Size: Standard)   Pulse 68   Temp 97.8 °F (36.6 °C) (Temporal)   Resp 16   Ht 5' 8\" " (1.727 m)   Wt 64.3 kg (141 lb 12.8 oz)   SpO2 99%   BMI 21.56 kg/m²     Physical Exam  Vitals and nursing note reviewed.   Constitutional:       General: She is not in acute distress.     Appearance: Normal appearance. She is well-developed. She is not ill-appearing.   HENT:      Head: Normocephalic and atraumatic.      Comments: Angular cheilitis on the left  Eyes:      Conjunctiva/sclera: Conjunctivae normal.   Neck:      Thyroid: No thyromegaly.      Vascular: No carotid bruit.   Cardiovascular:      Rate and Rhythm: Normal rate and regular rhythm.      Heart sounds: Normal heart sounds. No murmur heard.  Pulmonary:      Effort: Pulmonary effort is normal. No respiratory distress.      Breath sounds: Normal breath sounds. No wheezing.   Abdominal:      General: Bowel sounds are normal. There is no distension or abdominal bruit.      Palpations: Abdomen is soft.      Tenderness: There is no abdominal tenderness.      Hernia: No hernia is present.   Musculoskeletal:         General: Normal range of motion.      Cervical back: Neck supple. No rigidity.      Right lower leg: No edema.      Left lower leg: No edema.   Skin:     General: Skin is warm.   Neurological:      General: No focal deficit present.      Mental Status: She is alert and oriented to person, place, and time.      Cranial Nerves: No cranial nerve deficit.      Coordination: Coordination normal.   Psychiatric:         Mood and Affect: Mood normal.         Behavior: Behavior normal.         Thought Content: Thought content normal.

## 2025-01-01 NOTE — ASSESSMENT & PLAN NOTE
The patient is on Concerta, current dose is 54 mg daily.  She is under care of [facility]

## 2025-01-02 DIAGNOSIS — R10.13 EPIGASTRIC PAIN: ICD-10-CM

## 2025-01-02 DIAGNOSIS — K21.9 GASTROESOPHAGEAL REFLUX DISEASE WITHOUT ESOPHAGITIS: ICD-10-CM

## 2025-01-02 DIAGNOSIS — K59.09 CHRONIC CONSTIPATION: ICD-10-CM

## 2025-01-02 RX ORDER — POLYETHYLENE GLYCOL 3350 17 G/17G
510 POWDER, FOR SOLUTION ORAL DAILY
Qty: 510 G | Refills: 0 | Status: SHIPPED | OUTPATIENT
Start: 2025-01-02

## 2025-01-08 ENCOUNTER — TELEPHONE (OUTPATIENT)
Dept: PSYCHIATRY | Facility: CLINIC | Age: 20
End: 2025-01-08

## 2025-01-08 NOTE — TELEPHONE ENCOUNTER
Received call from POD with parent/guardian regarding GLORIA. Writer scheduled Medication Management  GLORIA for 6/4 1PM in office.

## 2025-01-08 NOTE — TELEPHONE ENCOUNTER
Received call from POD with parent/guardian regarding GLORIA. Writer shared GLORIA scheduling options and patient mom refused any options other than [facility] location and wanting female provider.   Writer attempted to explain long wait time for that, patient mom requested patient be placed on wait list. Writer agreed to place patient on wait list and will contact patient mom as soon as female at [facility] is available.

## 2025-01-08 NOTE — TELEPHONE ENCOUNTER
1st outreach attempt. Called and left message for patient to return a call to [phone] regarding GLORIA appt for Medication Management . Please transfer call to Psych Support Services for assistance.

## 2025-01-08 NOTE — TELEPHONE ENCOUNTER
Patient's mom called back regarding scheduling GLORIA appointment. Successfully transferred to Support services for assistance.

## 2025-01-08 NOTE — TELEPHONE ENCOUNTER
Upon further investigation of this patient's chart, writer discovered that her age range fits with a female provider at [place].    Called and left message for parent/guardian to return a call to [phone] regarding GLORIA appt for Medication Management . Please transfer call to Psych Support Services for assistance.

## 2025-01-14 NOTE — TELEPHONE ENCOUNTER
Called and left message for patient to return call regarding GLORIA appt 6/2025. Due to patient age, Dr. Steiner agreed to take patient to continue care. Please transfer call to support services.

## 2025-01-15 NOTE — TELEPHONE ENCOUNTER
Received call from POD with parent/guardian regarding GLORIA. Writer rescheduled Medication Management  GLORIA for 6/12/24 at 9am with Dr. Steiner.

## 2025-01-24 ENCOUNTER — OFFICE VISIT (OUTPATIENT)
Dept: GASTROENTEROLOGY | Facility: CLINIC | Age: 20
End: 2025-01-24
Payer: COMMERCIAL

## 2025-01-24 VITALS
SYSTOLIC BLOOD PRESSURE: 112 MMHG | TEMPERATURE: 96.9 F | BODY MASS INDEX: 21.67 KG/M2 | HEIGHT: 68 IN | WEIGHT: 143 LBS | DIASTOLIC BLOOD PRESSURE: 64 MMHG

## 2025-01-24 DIAGNOSIS — K59.09 CHRONIC CONSTIPATION: ICD-10-CM

## 2025-01-24 DIAGNOSIS — K21.9 CHRONIC GERD: Primary | ICD-10-CM

## 2025-01-24 PROCEDURE — 99214 OFFICE O/P EST MOD 30 MIN: CPT | Performed by: INTERNAL MEDICINE

## 2025-01-24 NOTE — PROGRESS NOTES
Name: Kathleen Guy      : 2005      MRN: [id number]  Encounter Provider: Kalpesh Hennessy MD  Encounter Date: 2025   Encounter department: [facility] [place]  :  Assessment & Plan  Chronic GERD  Patient's chronic symptoms of heartburn are completely under control.  She takes omeprazole 40 mg daily which I recommended we continue.  Happy to refill the medications if needed.  Regarding constipation, it continues to be uncontrolled despite taking MiraLAX daily.  She does not take enough water per day.  I encouraged her to to maintain at least 64 ounce of water intake per day.  Also discussed with patient that since constipation not be controlled with standard dose of laxative, the differentials could be hypothyroidism, slow colon motility, IBS, rectal mass, strictures in the colon, large rectal polyp.  This can be investigated with colonoscopy.  Patient and mom would like to think about getting the procedure done and get back to me later.  In the meanwhile encourage patient to increase MiraLAX intake to at least twice a day and maintain 64 ounce of water intake.  Get TSH levels which were ordered today.       Chronic constipation  As above.    Kalpesh Hennessy MD  Gastroenterology  [facility]  Date: 2025    Orders:    TSH, 3rd generation with Free T4 reflex; Future        History of Present Illness   HPI  Kathleen Guy is a 19 y.o. female with chronic constipation, chronic GERD, mood disorder presents for follow-up.    EGD in 2024 showed esophagitis and gastritis.  Omeprazole 40 mg daily was started at that time.  Patient has been on MiraLAX daily for history of chronic constipation.    Patient accompanied by mother to the office today.  She reports that heartburn continues to be controlled.  She has been taking omeprazole 40 mg daily.  She continues to have intermittent hard to pass stools.  She continues to take MiraLAX  "daily.  She reports an inadequate water intake.  No blood in stools.  Weight is stable.  Good appetite.    Patient had TSH levels done in 2020 which were normal.  Other labs done in October 2022 which were reviewed by me today and showed normal blood counts, renal function and LFTs.      Review of Systems   Constitutional:  Negative for chills and fever.   HENT:  Negative for ear pain and sore throat.    Eyes:  Negative for pain and visual disturbance.   Respiratory:  Negative for cough and shortness of breath.    Cardiovascular:  Negative for chest pain and palpitations.   Gastrointestinal:  Negative for abdominal pain and vomiting.   Genitourinary:  Negative for dysuria and hematuria.   Musculoskeletal:  Negative for arthralgias and back pain.   Skin:  Negative for color change and rash.   Neurological:  Negative for seizures and syncope.   All other systems reviewed and are negative.         Objective   /64   Temp (!) 96.9 °F (36.1 °C)   Ht 5' 8\" (1.727 m)   Wt 64.9 kg (143 lb)   BMI 21.74 kg/m²      Physical Exam  Vitals and nursing note reviewed.   Constitutional:       General: She is not in acute distress.     Appearance: She is well-developed.   HENT:      Head: Normocephalic and atraumatic.   Eyes:      Conjunctiva/sclera: Conjunctivae normal.   Cardiovascular:      Rate and Rhythm: Normal rate and regular rhythm.      Heart sounds: No murmur heard.  Pulmonary:      Effort: Pulmonary effort is normal. No respiratory distress.      Breath sounds: Normal breath sounds.   Abdominal:      Palpations: Abdomen is soft.      Tenderness: There is no abdominal tenderness.   Musculoskeletal:         General: No swelling.      Cervical back: Neck supple.   Skin:     General: Skin is warm and dry.      Capillary Refill: Capillary refill takes less than 2 seconds.   Neurological:      Mental Status: She is alert.   Psychiatric:         Mood and Affect: Mood normal.           "

## 2025-01-25 DIAGNOSIS — F90.0 ATTENTION DEFICIT HYPERACTIVITY DISORDER (ADHD), PREDOMINANTLY INATTENTIVE TYPE: ICD-10-CM

## 2025-01-25 NOTE — TELEPHONE ENCOUNTER
Medication Refill Request       Medication: methylphenidate (CONCERTA) 27 MG ER tablet     Dose/Frequency: 54 mg Daily     Quantity: 60 tablet     [facility]: [facility]    Office:   [] PCP/Provider -   [x] Specialty/Provider -     Does the patient have enough for 3 days?   [] Yes   [x] No - Send as HP to POD    Is the patient completely out of the medication or does not have enough until the next business day?  [] Yes - send to [facility] Hub  [x] No - Send as HP to POD

## 2025-01-27 ENCOUNTER — TELEPHONE (OUTPATIENT)
Age: 20
End: 2025-01-27

## 2025-01-27 DIAGNOSIS — F90.0 ATTENTION DEFICIT HYPERACTIVITY DISORDER (ADHD), PREDOMINANTLY INATTENTIVE TYPE: Primary | ICD-10-CM

## 2025-01-27 DIAGNOSIS — K21.9 GASTROESOPHAGEAL REFLUX DISEASE WITHOUT ESOPHAGITIS: ICD-10-CM

## 2025-01-27 DIAGNOSIS — R10.13 EPIGASTRIC PAIN: ICD-10-CM

## 2025-01-27 DIAGNOSIS — K59.09 CHRONIC CONSTIPATION: ICD-10-CM

## 2025-01-27 RX ORDER — METHYLPHENIDATE HYDROCHLORIDE 54 MG/1
54 TABLET ORAL DAILY
Qty: 30 TABLET | Refills: 0 | Status: SHIPPED | OUTPATIENT
Start: 2025-01-27

## 2025-01-27 RX ORDER — METHYLPHENIDATE HYDROCHLORIDE 27 MG/1
54 TABLET ORAL DAILY
Qty: 60 TABLET | Refills: 0 | Status: SHIPPED | OUTPATIENT
Start: 2025-01-27 | End: 2025-01-27

## 2025-01-27 NOTE — TELEPHONE ENCOUNTER
Medication:  PDMP 1 [id number] 12/26/2024 12/26/2024 Methylphenidate Hcl (Tablet, Extended Release) 60.0 30 27 MG [id number] Commercial Insurance 0 / 0 [place]   1 [id number] 11/19/2024 11/19/2024 Methylphenidate Hcl (Tablet, Extended Release) 60.0 30 27 MG NA [id number] Commercial Insurance 0 / 0 [place]   1 [id number] 10/20/2024 10/20/2024 Methylphenidate Hcl (Tablet, Extended Release) 30.0 30 54 MG [id number] Commercial Insurance 0 / 0 [place]     Active agreement on file -

## 2025-01-27 NOTE — TELEPHONE ENCOUNTER
Patient mother calling if its ok to wait till tomorrow as she is out of the medication and the pharmacy states its not avail until tomorrow.  Transferred to clinical team

## 2025-01-27 NOTE — TELEPHONE ENCOUNTER
Spoke to pharmacist at [facility].  They are out of Methylphenidate 27 MG tablets.   However patient was previously on the 54 MG tablets which is now back in stock.  They are asking if provider can send script for the 54 MG tablet.    Will refer to Nessa Mckeon for review.

## 2025-01-27 NOTE — TELEPHONE ENCOUNTER
Patient called refill line, refill approval in process.   Confirmed [facility] pharmacy, she does not have any meds left.

## 2025-01-27 NOTE — TELEPHONE ENCOUNTER
Patients mother called , she is hoping to have script sent over to pharmacy ASAP. Patient will need a new script sent over prior to the AM.

## 2025-01-27 NOTE — TELEPHONE ENCOUNTER
Spoke with mother. Pharmacy expects to get Concerta tomorrow afternoon, which means Kathleen will miss her morning dose. Asking if the dose should be given when they receive the medication or hold the dose all together? Reviewed possible effects of missing the dose, but should not have significant withdrawal for that day. Agreed with holding the full dose of medication in the afternoon, so it doesn't interfere with sleep.    Will call Kathleen or her mother if there are other directions.

## 2025-01-28 RX ORDER — POLYETHYLENE GLYCOL 3350 17 G/17G
510 POWDER, FOR SOLUTION ORAL DAILY
Qty: 15300 G | Refills: 5 | Status: SHIPPED | OUTPATIENT
Start: 2025-01-28

## 2025-01-31 LAB — TSH SERPL-ACNC: 2.04 MIU/L

## 2025-02-01 ENCOUNTER — RESULTS FOLLOW-UP (OUTPATIENT)
Dept: GASTROENTEROLOGY | Facility: CLINIC | Age: 20
End: 2025-02-01

## 2025-02-12 ENCOUNTER — OFFICE VISIT (OUTPATIENT)
Dept: FAMILY MEDICINE CLINIC | Facility: CLINIC | Age: 20
End: 2025-02-12
Payer: COMMERCIAL

## 2025-02-12 VITALS
SYSTOLIC BLOOD PRESSURE: 118 MMHG | HEART RATE: 97 BPM | DIASTOLIC BLOOD PRESSURE: 70 MMHG | WEIGHT: 144.4 LBS | OXYGEN SATURATION: 98 % | BODY MASS INDEX: 21.89 KG/M2 | HEIGHT: 68 IN | TEMPERATURE: 97.5 F | RESPIRATION RATE: 18 BRPM

## 2025-02-12 DIAGNOSIS — J02.9 SORE THROAT: Primary | ICD-10-CM

## 2025-02-12 LAB
SARS-COV-2 AG UPPER RESP QL IA: NEGATIVE
VALID CONTROL: NORMAL

## 2025-02-12 PROCEDURE — 99213 OFFICE O/P EST LOW 20 MIN: CPT | Performed by: FAMILY MEDICINE

## 2025-02-12 PROCEDURE — 87811 SARS-COV-2 COVID19 W/OPTIC: CPT | Performed by: FAMILY MEDICINE

## 2025-02-13 ENCOUNTER — NURSE TRIAGE (OUTPATIENT)
Dept: OTHER | Facility: OTHER | Age: 20
End: 2025-02-13

## 2025-02-13 ENCOUNTER — OFFICE VISIT (OUTPATIENT)
Dept: FAMILY MEDICINE CLINIC | Facility: CLINIC | Age: 20
End: 2025-02-13
Payer: COMMERCIAL

## 2025-02-13 VITALS
SYSTOLIC BLOOD PRESSURE: 118 MMHG | DIASTOLIC BLOOD PRESSURE: 72 MMHG | RESPIRATION RATE: 18 BRPM | HEART RATE: 97 BPM | TEMPERATURE: 98 F | HEIGHT: 68 IN | WEIGHT: 142.4 LBS | OXYGEN SATURATION: 99 % | BODY MASS INDEX: 21.58 KG/M2

## 2025-02-13 DIAGNOSIS — U07.1 COVID: Primary | ICD-10-CM

## 2025-02-13 LAB
SARS-COV-2 AG UPPER RESP QL IA: POSITIVE
VALID CONTROL: ABNORMAL

## 2025-02-13 PROCEDURE — 87811 SARS-COV-2 COVID19 W/OPTIC: CPT | Performed by: FAMILY MEDICINE

## 2025-02-13 PROCEDURE — 99213 OFFICE O/P EST LOW 20 MIN: CPT | Performed by: FAMILY MEDICINE

## 2025-02-13 NOTE — TELEPHONE ENCOUNTER
"Reason for Disposition  • [1] COVID-19 infection suspected AND [2] mild symptoms (cough, fever, or others) AND [3] negative COVID-19 rapid test    Additional Information  • [1] Symptoms of COVID-19 (e.g., cough, fever, SOB, or others) AND [2] doctor (or NP/PA) suspected COVID-19 based on symptoms    Answer Assessment - Initial Assessment Questions  1. ONSET: \"When did the cough begin?\"         Today     2. SEVERITY: \"How bad is the cough today?\"         Mild     3. SPUTUM: \"Describe the color of your sputum\" (e.g., none, dry cough; clear, white, yellow, green)        White    4. HEMOPTYSIS: \"Are you coughing up any blood?\" If Yes, ask: \"How much?\" (e.g., flecks, streaks, tablespoons, etc.)        Denies    5. DIFFICULTY BREATHING: \"Are you having difficulty breathing?\" If Yes, ask: \"How bad is it?\" (e.g., mild, moderate, severe)         Denies     6. FEVER: \"Do you have a fever?\" If Yes, ask: \"What is your temperature, how was it measured, and when did it start?\"        Denies     7. CARDIAC HISTORY: \"Do you have any history of heart disease?\" (e.g., heart attack, congestive heart failure)         Denies     8. LUNG HISTORY: \"Do you have any history of lung disease?\"  (e.g., pulmonary embolus, asthma, emphysema)        Denies     9. PE RISK FACTORS: \"Do you have a history of blood clots?\" (or: recent major surgery, recent prolonged travel, bedridden)        Denies     10. OTHER SYMPTOMS: \"Do you have any other symptoms?\" (e.g., runny nose, wheezing, chest pain)          Congestion, sore throat, runny nose.  Denies fevers.    11. PREGNANCY: \"Is there any chance you are pregnant?\" \"When was your last menstrual period?\"          Denies    Protocols used: Cough - Acute Productive-Adult-AH, COVID-19 - Exposure-Adult-AH, COVID-19 - Diagnosed or Suspected-Adult-AH    "

## 2025-02-13 NOTE — PROGRESS NOTES
"Name: Kathleen Guy      : 2005      MRN: [id number]  Encounter Provider: Arabella Aguilar DO  Encounter Date: 2025   Encounter department: [facility]  :  Assessment & Plan  COVID  Symptom onset 2 days ago. Tested negative yesterday, positive today. Recommend symptom directed treatment and supportive care. Follow up if any new concerns.              History of Present Illness   Cough  Associated symptoms include a sore throat.   Sore Throat   Associated symptoms include coughing.     Here today for worsening respiratory symptoms. Mom has COVID, on Paxlovid. Patient was seen here yesterday and tested negative. Today symptoms include sore throat, congestion, more mucous production, chills. Took Advil but nothing else.   Review of Systems   HENT:  Positive for sore throat.    Respiratory:  Positive for cough.        Objective   /72 (BP Location: Right arm, Patient Position: Sitting, Cuff Size: Standard)   Pulse 97   Temp 98 °F (36.7 °C) (Temporal)   Resp 18   Ht 5' 8\" (1.727 m)   Wt 64.6 kg (142 lb 6.4 oz)   LMP  (LMP Unknown)   SpO2 99%   BMI 21.65 kg/m²      Physical Exam  Vitals reviewed.   Constitutional:       Appearance: Normal appearance.   HENT:      Right Ear: Tympanic membrane, ear canal and external ear normal.      Left Ear: Tympanic membrane, ear canal and external ear normal.      Nose: Congestion present.      Mouth/Throat:      Mouth: Mucous membranes are moist.      Pharynx: Posterior oropharyngeal erythema present.   Eyes:      Extraocular Movements: Extraocular movements intact.      Conjunctiva/sclera: Conjunctivae normal.   Cardiovascular:      Rate and Rhythm: Normal rate and regular rhythm.      Heart sounds: Normal heart sounds.   Pulmonary:      Effort: Pulmonary effort is normal.      Breath sounds: Normal breath sounds.   Abdominal:      General: Abdomen is flat.   Skin:     General: Skin is warm and dry.   Neurological:      Mental " Status: She is alert.   Psychiatric:         Mood and Affect: Mood normal.         Behavior: Behavior normal.

## 2025-02-13 NOTE — TELEPHONE ENCOUNTER
Patient was seen in the office yesterday but her symptoms are becoming worse and her mom tested positive for Covid yesterday.  She is requesting an appointment for today.  Appointment scheduled at 9:40 AM with Dr. Aguilar.    Home care advice provided.  Patient verbalized understanding and was appreciative.

## 2025-02-19 DIAGNOSIS — F90.0 ATTENTION DEFICIT HYPERACTIVITY DISORDER (ADHD), PREDOMINANTLY INATTENTIVE TYPE: ICD-10-CM

## 2025-02-19 RX ORDER — METHYLPHENIDATE HYDROCHLORIDE 54 MG/1
54 TABLET ORAL DAILY
Qty: 30 TABLET | Refills: 0 | Status: SHIPPED | OUTPATIENT
Start: 2025-02-19

## 2025-02-19 NOTE — TELEPHONE ENCOUNTER
Reason for call:   [x] Refill   [] Prior Auth  [] Other:     Office: [facility]   [] PCP/Provider -   [x] Specialty/Provider - Psychiatry/ Alina Steiner     Medication: methylphenidate (CONCERTA)     Dose/Frequency: 54 mg er/ daily     Quantity: 30 day supply     Pharmacy: [facility]     Does the patient have enough for 3 days?   [x] Yes   [] No - Send as HP to POD

## 2025-02-24 DIAGNOSIS — K59.09 CHRONIC CONSTIPATION: ICD-10-CM

## 2025-02-24 DIAGNOSIS — R10.13 EPIGASTRIC PAIN: ICD-10-CM

## 2025-02-24 DIAGNOSIS — K21.9 GASTROESOPHAGEAL REFLUX DISEASE WITHOUT ESOPHAGITIS: ICD-10-CM

## 2025-02-26 RX ORDER — POLYETHYLENE GLYCOL 3350 17 G/17G
510 POWDER, FOR SOLUTION ORAL DAILY
Qty: 15300 G | Refills: 5 | Status: SHIPPED | OUTPATIENT
Start: 2025-02-26

## 2025-03-06 DIAGNOSIS — F41.1 GAD (GENERALIZED ANXIETY DISORDER): ICD-10-CM

## 2025-03-06 DIAGNOSIS — F90.0 ATTENTION DEFICIT HYPERACTIVITY DISORDER (ADHD), PREDOMINANTLY INATTENTIVE TYPE: ICD-10-CM

## 2025-03-06 RX ORDER — GUANFACINE 2 MG/1
2 TABLET, EXTENDED RELEASE ORAL
Qty: 90 TABLET | Refills: 1 | Status: SHIPPED | OUTPATIENT
Start: 2025-03-06

## 2025-03-06 RX ORDER — ESCITALOPRAM OXALATE 20 MG/1
20 TABLET ORAL DAILY
Qty: 90 TABLET | Refills: 0 | Status: SHIPPED | OUTPATIENT
Start: 2025-03-06

## 2025-03-06 NOTE — TELEPHONE ENCOUNTER
Reason for call:   [x] Refill   [] Prior Auth  [] Other:     Office:   [] PCP/Provider -   [x] Specialty/Provider - Psych    escitalopram (LEXAPRO) 20 mg tablet / Take 1 tablet (2 mg total) by mouth daily at bedtime, / Qty 90    guanFACINE HCl ER (INTUNIV) 2 MG TB24/ Take 1 tablet (2 mg total) by mouth daily at bedtime / Qty 90    Pharmacy:  [facility] [address] [facility]   Does the patient have enough for 3 days?   [] Yes   [] No - Send as HP to POD    Mail Away Pharmacy   Does the patient have enough for 10 days?   [] Yes   [x] No - Send as HP to POD

## 2025-03-07 NOTE — TELEPHONE ENCOUNTER
Kathleen is a former Pt of Nessa SANTAMARIA who has left [facility]'s practice.  Pt is actually scheduled to transfer to the care of Alina Steiner MD on 6/12/2025 but will need medication renewal.  Nessa's most recent note on 12/26/2024 was reviewed and Pt receives Guanfacine ER 2mg qd for ADHD Sxs.  I e-scribed a new Rx for Qty 90 R1 to Pt's designated [facility] pharmacy so she will have enough to carry her through the wait period.

## 2025-03-21 DIAGNOSIS — F90.0 ATTENTION DEFICIT HYPERACTIVITY DISORDER (ADHD), PREDOMINANTLY INATTENTIVE TYPE: ICD-10-CM

## 2025-03-21 RX ORDER — METHYLPHENIDATE HYDROCHLORIDE 54 MG/1
54 TABLET ORAL DAILY
Qty: 30 TABLET | Refills: 0 | Status: SHIPPED | OUTPATIENT
Start: 2025-03-21

## 2025-03-21 NOTE — TELEPHONE ENCOUNTER
Reason for call:   [x] Refill   [] Prior Auth  [] Other:     Office:   [] PCP/Provider -   [x] Specialty/Provider - psych     Medication: methylphenidate ER    Dose/Frequency: 54 mg take 1 tablet daily    Quantity: 30    [facility]   Does the patient have enough for 3 days?   [x] Yes   [] No - Send as HP to POD    Mail Away Pharmacy   Does the patient have enough for 10 days?   [] Yes   [] No - Send as HP to POD

## 2025-03-21 NOTE — TELEPHONE ENCOUNTER
Refill must be reviewed and completed by the office or provider. The refill is unable to be approved or denied by the medication management team.      [id number] 02/23/2025 02/19/2025 Methylphenidate Hcl (Tablet, Extended Release) 30.0 30 54 MG NA KATJA CRUZ [id number] Commercial Insurance 0 / 0 [place]    1 [id number] 01/27/2025 01/27/2025 Methylphenidate Hcl (Tablet, Extended Release) 30.0 30 54 MG NA EVANGELINA WINKLER [id number]

## 2025-04-01 ENCOUNTER — PATIENT MESSAGE (OUTPATIENT)
Dept: FAMILY MEDICINE CLINIC | Facility: CLINIC | Age: 20
End: 2025-04-01

## 2025-04-06 DIAGNOSIS — K21.9 GASTROESOPHAGEAL REFLUX DISEASE WITHOUT ESOPHAGITIS: ICD-10-CM

## 2025-04-06 DIAGNOSIS — R10.13 EPIGASTRIC PAIN: ICD-10-CM

## 2025-04-06 DIAGNOSIS — K59.09 CHRONIC CONSTIPATION: ICD-10-CM

## 2025-04-07 RX ORDER — POLYETHYLENE GLYCOL 3350 17 G/17G
510 POWDER, FOR SOLUTION ORAL DAILY
Qty: 15300 G | Refills: 5 | Status: SHIPPED | OUTPATIENT
Start: 2025-04-07

## 2025-04-21 DIAGNOSIS — F90.0 ATTENTION DEFICIT HYPERACTIVITY DISORDER (ADHD), PREDOMINANTLY INATTENTIVE TYPE: ICD-10-CM

## 2025-04-21 RX ORDER — METHYLPHENIDATE HYDROCHLORIDE 54 MG/1
54 TABLET ORAL DAILY
Qty: 30 TABLET | Refills: 0 | Status: SHIPPED | OUTPATIENT
Start: 2025-04-21

## 2025-04-21 NOTE — TELEPHONE ENCOUNTER
03/23/2025 03/21/2025 Methylphenidate Hcl (Tablet, Extended Release) 30.0 30 54 MG NA KATJA CRUZ [facility] [place] / 0 [place]   1 [id number] 02/23/2025 02/19/2025 Methylphenidate Hcl (Tablet, Extended Release) 30.0 30 54 MG NA KATJA CRUZ [id number] [place] / 0 [place]   1 [id number] 01/27/2025 01/27/2025 Methylphenidate Hcl (Tablet, Extended Release) 30.0 30 54 MG NA EVANGELINA WINKLER [id number] [place] / 0 [place]   1 [id number] 12/26/2024 12/26/2024 Methylphenidate Hcl (Tablet, Extended Release) 60.0 30 27 MG NA EVANGELINA WINKLER [id number] [place] / 0 [place]   1 [id number] 11/19/2024 11/19/2024 Methylphenidate Hcl (Tablet, Extended Release) 60.0 30 27 MG  EVANGELINA WINKLER [id number] [place]   1 [id number] 10/20/2024 10/20/2024 Methylphenidate Hcl (Tablet, Extended Release) 30.0 30 54 MG  EVANGELINA WINKLER [id number] [place] 0 [place]   1 [id number] 09/21/2024 09/19/2024 Methylphenidate Hcl (Tablet, Extended Release) 30.0 30 54 MG NA EVANGELINA WINKLER [id number] [place] / 0 [place]   1 [id number] 08/26/2024 08/26/2024 Methylphenidate Hcl (Tablet, Extended Release) 30.0 30 36 MG  EVANGELINA WINKLER [facility] [place] / 0 [place]   1 [id number] 07/29/2024 07/24/2024 Dexmethylphenidate Hcl (Capsule, Extended Release) 30.0 30 15 MG  EVANGELINA WINKLER [id number] [place] / 0 [place]   1 [id number] 06/29/2024 05/30/2024 Dexmethylphenidate Hcl (Capsule, Extended Re

## 2025-04-21 NOTE — TELEPHONE ENCOUNTER
Reason for call:   [x] Refill   [] Prior Auth  [] Other:     [facility]   [] PCP/Provider -   [x] Specialty/Provider - Psychiatry/ Alina Steiner     Medication: methylphenidate (CONCERTA)     Dose/Frequency: 54 mg ER/ daily     Quantity: 30 day supply     Pharmacy: [facility]   Does the patient have enough for 3 days?   [x] Yes   [] No - Send as HP to POD    Mail Away Pharmacy   Does the patient have enough for 10 days?   [] Yes   [] No - Send as HP to POD

## 2025-05-06 ENCOUNTER — PATIENT MESSAGE (OUTPATIENT)
Dept: FAMILY MEDICINE CLINIC | Facility: CLINIC | Age: 20
End: 2025-05-06

## 2025-05-06 DIAGNOSIS — F41.1 GAD (GENERALIZED ANXIETY DISORDER): ICD-10-CM

## 2025-05-06 RX ORDER — ESCITALOPRAM OXALATE 20 MG/1
20 TABLET ORAL DAILY
Qty: 90 TABLET | Refills: 0 | Status: SHIPPED | OUTPATIENT
Start: 2025-05-06

## 2025-05-06 NOTE — TELEPHONE ENCOUNTER
Patient's mother called for a refill on her daughter's Lexapro. They have enough to last them until the beginning of June, but it has no refills so she would like for the order to atleast be put in.     Reason for call:   [x] Refill   [] Prior Auth  [] Other:     Office:   [] PCP/Provider -   [x] Specialty/Provider - Psychiatry     Medication: Lexapro     Dose/Frequency: 20 mg table taken by mouth once daily     Quantity: 90    Pharmacy: [facility] - [address] [phone]     Local Pharmacy   Does the patient have enough for 3 days?   [x] Yes   [] No - Send as HP to POD    Mail Away Pharmacy   Does the patient have enough for 10 days?   [] Yes   [] No - Send as HP to POD

## 2025-05-07 NOTE — PATIENT COMMUNICATION
Pt stated she doesn't want to go to dermatologist but would like Vitamin E topical is that prescription? Or over the counter

## 2025-05-07 NOTE — PATIENT COMMUNICATION
"Patient returned call an message given:  \"Vitamin E is over the counter that you can purchase\"    Patient had no further questions  "

## 2025-05-20 DIAGNOSIS — F90.0 ATTENTION DEFICIT HYPERACTIVITY DISORDER (ADHD), PREDOMINANTLY INATTENTIVE TYPE: ICD-10-CM

## 2025-05-20 RX ORDER — METHYLPHENIDATE HYDROCHLORIDE 54 MG/1
54 TABLET ORAL DAILY
Qty: 30 TABLET | Refills: 0 | Status: SHIPPED | OUTPATIENT
Start: 2025-05-20

## 2025-05-20 NOTE — TELEPHONE ENCOUNTER
Reason for call:   [x] Refill   [] Prior Auth  [] Other:     Office:   [] PCP/Provider -   [x] Specialty/Provider - Psych    Medication: methylphenidate (CONCERTA) 54 MG ER     Dose/Frequency:  Take 1 tablet (54 mg total) by mouth daily      Quantity: 30    [address] [facility] [id number] [facility]     Local Pharmacy   Does the patient have enough for 3 days?   [x] Yes   [] No - Send as HP to POD    Mail Away Pharmacy   Does the patient have enough for 10 days?   [] Yes   [] No - Send as HP to POD

## 2025-05-20 NOTE — TELEPHONE ENCOUNTER
04/23/2025 04/21/2025 04/21/2025 Methylphenidate Hcl (Tablet, Extended Release) 30.0 30 54 MG NA KATJA CRUZ [id number] [place] / 0 [place]   1 [id number] ** 03/23/2025 03/21/2025 Methylphenidate Hcl (Tablet, Extended Release) 30.0 30 54 MG NA KATJA CRUZ [facility] [place] / 0 [place]   1 [id number] 02/23/2025 02/23/2025 02/19/2025 Methylphenidate Hcl (Tablet, Extended Release) 30.0 30 54 MG NA KATJA CRUZ [facility] [place] 0 / 0 [place]   1 [id number] 01/27/2025 01/27/2025 01/27/2025 Methylphenidate Hcl (Tablet, Extended Release) 30.0 30 54 MG NA EVANGELINA WINKLER [facility] Commercial Insurance 0 / 0 [place]   1 [id number] 12/26/2024 12/26/2024 12/26/2024 Methylphenidate Hcl (Tablet, Extended Release) 60.0 30 27 MG NA EVANGELINA WINKLER [facility] Commercial Insurance 0 / 0 [place]   1 [id number] 11/20/2024 11/19/2024 11/19/2024 Methylphenidate Hcl (Tablet, Extended Release) 60.0 30 27 MG NA EVANGELINA WINKLER [facility] Commercial Insurance 0 / 0 [place]   1 [id number] 10/22/2024 10/20/2024 10/20/2024 Methylphenidate Hcl (Tablet, Extended Release) 30.0 30 54 MG NA EVANGELINA WINKLER [facility] Commercial Insurance 0 / 0 [place]   1 [id number] 09/21/2024 09/21/2024 09/19/2024 Methylphenidate Hcl (Tablet, Extended Release) 30.0 30 54 MG NA EVANGELINA WINKLER [facility] Commercial Insurance 0 / 0 [place]   1 [id number] 08/27/2024 08/26/2024 08/26/2024 Methylphenidate Hcl (Tablet, Extended Release) 30.0 30 36 MG NA EVANGELINA WINKLER [facility] Commercial Insurance 0 / 0 [place]   1 [id number] 07/29/2024 07/29/2024 07/24/2024 Dexmethylphenidate Hcl (Capsule, Extend

## 2025-05-28 ENCOUNTER — TELEPHONE (OUTPATIENT)
Dept: PSYCHIATRY | Facility: CLINIC | Age: 20
End: 2025-05-28

## 2025-05-28 NOTE — TELEPHONE ENCOUNTER
Called m requesting return call from patient to reschedule 6/12 apt with Dr. Steiner to a different provider.    If patient returns call please transfer to me

## 2025-05-30 NOTE — TELEPHONE ENCOUNTER
Patient's mother called back per prior note. Writer asked if patient was available for GLORIA r/s. Patient was working at time of call. No consent on file for mother. Writer sent patient comm consent via [employer] to sign so we can speak with mother. Mother will call back to r/s once that is signed.

## 2025-05-30 NOTE — TELEPHONE ENCOUNTER
Patient and her mother returned call to reschedule GLORIA. Writer transferred her to support services.

## 2025-06-20 DIAGNOSIS — F90.0 ATTENTION DEFICIT HYPERACTIVITY DISORDER (ADHD), PREDOMINANTLY INATTENTIVE TYPE: ICD-10-CM

## 2025-06-20 NOTE — TELEPHONE ENCOUNTER
Reason for call:   [x] Refill   [] Prior Auth  [] Other:     Office:   [] PCP/Provider -   [x] Specialty/Provider - [facility]     Medication:  methylphenidate (CONCERTA) 54 MG ER tablet    Dose/Frequency: Take 1 tablet (54 mg total) by mouth daily     Quantity: 30    Pharmacy: [address] [place] - [address] [place] [address].      Local Pharmacy   Does the patient have enough for 3 days?   [x] Yes   [] No - Send as HP to POD    Mail Away Pharmacy   Does the patient have enough for 10 days?   [] Yes   [] No - Send as HP to POD

## 2025-06-23 RX ORDER — METHYLPHENIDATE HYDROCHLORIDE 54 MG/1
54 TABLET ORAL DAILY
Qty: 30 TABLET | Refills: 0 | Status: SHIPPED | OUTPATIENT
Start: 2025-06-23 | End: 2025-06-24 | Stop reason: SDUPTHER

## 2025-06-23 NOTE — TELEPHONE ENCOUNTER
Patients mother called rx refill line inquiring status on Concerta refill request. Informed request remains pending, awaiting provider approval. Allowing 72 hours.     Patient has an appointment tomorrow to establish care with Dr. Andino, although will need medicine for tomorrow. High priority placed upon request.

## 2025-06-23 NOTE — TELEPHONE ENCOUNTER
05/21/2025 05/20/2025 05/20/2025 Methylphenidate Hcl (Tablet, Extended Release) 30.0 30 54 MG NA KATJA NANCY [id number] [place] / 0 [place]   1 [id number] 04/23/2025 04/21/2025 04/21/2025 Methylphenidate Hcl (Tablet, Extended Release) 30.0 30 54 MG NA KATJA NANCY [facility] [place] / 0 [place]   1 [id number] ** 03/23/2025 03/21/2025 Methylphenidate Hcl (Tablet, Extended Release) 30.0 30 54 MG NA KATJA CRUZ [facility] [place] / 0 [place]   1 [id number] 02/23/2025 02/23/2025 02/19/2025 Methylphenidate Hcl (Tablet, Extended Release) 30.0 30 54 MG NA KATJA NANCY [facility] [place] / 0 [place]   1 [id number] 01/27/2025 01/27/2025 01/27/2025 Methylphenidate Hcl (Tablet, Extended Release) 30.0 30 54 MG NA EVANGELINA WINKLER [facility] [place] 0 / 0 [place]   1 [id number] 12/26/2024 12/26/2024 12/26/2024 Methylphenidate Hcl (Tablet, Extended Release) 60.0 30 27 MG NA EVANGELINA WINKLER [facility] Commercial Insurance 0 / 0 [place]   1 [id number] 11/20/2024 11/19/2024 11/19/2024 Methylphenidate Hcl (Tablet, Extended Release) 60.0 30 27 MG  EVANGELINA WINKLER [facility] [place] / 0 [place]   1 [id number] 10/22/2024 10/20/2024 10/20/2024 Methylphenidate Hcl

## 2025-06-24 ENCOUNTER — OFFICE VISIT (OUTPATIENT)
Dept: PSYCHIATRY | Facility: CLINIC | Age: 20
End: 2025-06-24
Payer: COMMERCIAL

## 2025-06-24 DIAGNOSIS — F90.0 ATTENTION DEFICIT HYPERACTIVITY DISORDER (ADHD), PREDOMINANTLY INATTENTIVE TYPE: ICD-10-CM

## 2025-06-24 DIAGNOSIS — F40.10 SOCIAL ANXIETY DISORDER: Primary | ICD-10-CM

## 2025-06-24 DIAGNOSIS — Z01.89 ENCOUNTER FOR NEUROPSYCHOLOGICAL TESTING: ICD-10-CM

## 2025-06-24 DIAGNOSIS — Z87.898 HISTORY OF LEARNING DISABILITY: ICD-10-CM

## 2025-06-24 PROCEDURE — 90792 PSYCH DIAG EVAL W/MED SRVCS: CPT | Performed by: PSYCHIATRY & NEUROLOGY

## 2025-06-24 RX ORDER — GUANFACINE 2 MG/1
2 TABLET, EXTENDED RELEASE ORAL
Qty: 90 TABLET | Refills: 1 | Status: SHIPPED | OUTPATIENT
Start: 2025-06-24

## 2025-06-24 RX ORDER — METHYLPHENIDATE HYDROCHLORIDE 54 MG/1
54 TABLET ORAL DAILY
Qty: 30 TABLET | Refills: 0 | Status: SHIPPED | OUTPATIENT
Start: 2025-06-24 | End: 2025-07-24

## 2025-06-24 RX ORDER — METHYLPHENIDATE HYDROCHLORIDE 54 MG/1
54 TABLET ORAL DAILY
Start: 2025-06-24 | End: 2025-06-24 | Stop reason: SDUPTHER

## 2025-06-24 RX ORDER — ESCITALOPRAM OXALATE 20 MG/1
20 TABLET ORAL DAILY
Qty: 90 TABLET | Refills: 1 | Status: SHIPPED | OUTPATIENT
Start: 2025-06-24 | End: 2025-12-21

## 2025-06-24 RX ORDER — BUSPIRONE HYDROCHLORIDE 5 MG/1
5 TABLET ORAL 2 TIMES DAILY
Qty: 60 TABLET | Refills: 1 | Status: SHIPPED | OUTPATIENT
Start: 2025-06-24 | End: 2025-08-23

## 2025-06-24 NOTE — PSYCH
PSYCHIATRIC EVALUATION     Name: Kathleen Guy      : 2005      MRN: [id number]  Encounter Provider: Genia Andino DO  Encounter Date: 2025   Encounter department: [facility]    Insurance: Payor: BLUE CROSS / Plan: INDEPENDENCE PERSONAL CHOICE / Product Type: Blue PPO /      Reason for visit:   Chief Complaint   Patient presents with    Anxiety    ADHD    Establish Care    Medication Management   :  Assessment & Plan  Social anxiety disorder  Kathleen Guy is a 19 y.o. female,  never , currently in romantic relationship with male, domiciled with parents and pet birds, currently employed, w/ PMH of GERD, cardiac murmur (reported by patient to be stable at this time), bedwetting (reported as resolved), intermittent intractable headaches and PPH of social anxiety disorder versus JUDSON, ADHD, unspecified depressive disorder, and learning disability, no prior psychiatric admissions, no prior SA, no  h/o self-injurious behavior,  who  presents in the outpatient office today for psychiatric evaluation and transfer of care appointment in the setting of ongoing ADHD  and social anxiety treatment with recent worsening of anxiety secondary to new life changes/stressors.  Patient reports that since her last office visit with her former provider SARI Carlson she has been compliant with her prescribed medications including Lexapro 20 mg once daily, Intuniv 2 mg nightly, Concerta 54 mg once daily.     Per patient report, the primary complaint and recent symptoms leading up to this evaluation while taking medications as directed include: Worsening anxiety, increased frequency of panic attack, fluctuating motivation (per mother's report), heightened sensory input specifically when in loud environments.  No overt signs or reports of psychosis, bipolar, OCD, eating disorder.  Both the patient and her mother are open to medication management, psychotherapy, and  "neuropsychological testing moving forward.    PLAN:  Continue Concerta 54 mg once daily for ADHD symptoms  Continue Intuniv 2 mg nightly for ADHD symptoms and impulsivity   Continue Lexapro 20 mg once daily for anxiety and related depression like symptoms  Initiate BuSpar 5 mg twice daily for adjunct treatment of anxiety  Referral for psychotherapy placed  Referral for psychological testing placed  Follow-up medication management visit in 1 month  Patient instructed to call office in two weeks to discuss progress with anxiety on buspar.       Orders:    busPIRone (BUSPAR) 5 mg tablet; Take 1 tablet (5 mg total) by mouth 2 (two) times a day    escitalopram (LEXAPRO) 20 mg tablet; Take 1 tablet (20 mg total) by mouth daily    Ambulatory referral to Psych Services; Future    Attention deficit hyperactivity disorder (ADHD), predominantly inattentive type  Management per principle problem     Orders:    guanFACINE HCl ER (INTUNIV) 2 MG TB24; Take 1 tablet (2 mg total) by mouth daily at bedtime    methylphenidate (CONCERTA) 54 MG ER tablet; Take 1 tablet (54 mg total) by mouth daily Max Daily Amount: 54 mg    Ambulatory referral to Psych Services; Future    Encounter for neuropsychological testing  Patient was given referral for neuropsychological testing to assess for social communication disorder , autism spectrum disorder or any other condition that may further explain her current and past social difficulties, processing struggles, and current anxiety in the setting of new life changes.   Per record review this type of testing was suggested in the past but does not appear to have taken place.    PLAN:  Patient was given a paper copy of referral to neuropsychological services and a list of local/recommended neuropsychology services/providers.     Orders:    Ambulatory Referral to Neuropsychology; Future    History of learning disability  Patient reports during evaluation today that she has history of a \"processing\" " "learning disability. She reports having an IEP in school. She reports having a history of \"processing\" things \"slowly\"    Recommend neuropsychological testing as described above           Treatment Recommendations/Precautions:    Educated about diagnosis and treatment modalities. Verbalizes understanding and agreement with the treatment plan.  Discussed self monitoring of symptoms, and symptom monitoring tools.  Discussed medications and if treatment adjustment was needed or desired.  Medication management every 1-3 months  Aware of 24 hour and weekend coverage for urgent situations accessed by calling [facility] main practice number  Referral for individual psychotherapy and for neuropsychological assessment  Unable to complete crisis plan and treatment plan during today's session secondary to time constraints. No immediate safety concerns. Will complete thsi documanetation at next visit.   I am scheduling this patient out for greater than 3 months: No    Medications Risks/Benefits:      Risks, Benefits And Possible Side Effects Of Medications:    Risks, benefits, and possible side effects of medications explained to Kathleen and she (or legal representative) verbalizes understanding and agreement for treatment.    Controlled Medication Discussion:     Kathleen has been filling controlled prescriptions on time as prescribed according to [place] Prescription Drug Monitoring Program.      History of Present Illness     Kathleen Guy is a 19 y.o. female,  never , currently in romantic relationship with male, domiciled with parents and pet birds, currently employed, w/ PMH of GERD, cardiac murmur (reported by patient to be stable at this time), bedwetting (reported as resolved), intermittent intractable headaches and PPH of social anxiety disorder versus JUDSON, ADHD, unspecified depressive disorder, and learning disability, no prior psychiatric admissions, no prior SA, no  h/o " "self-injurious behavior,  who  presents in the outpatient office today for psychiatric evaluation and transfer of care appointment in the setting of ongoing ADHD  and social anxiety treatment with recent worsening of anxiety secondary to new life changes/stressors.    Patient was accompanied at this initial evaluation/transfer of care appointment with her mother who is able to provide collateral information/additional information to certain questions that the patient was unsure of.  During evaluation today, patient reports that her mood most pressing issue at this time is a recent worsening of anxiety especially over the last 3 months since she began her first romantic relationship with a male that she met online at \"[employer]\".  Both patient and her mother report that this is a healthy relationship overall.  However the patient is struggling with certain social related stressors and anxiety symptoms in the setting of this new type of relationship.  Patient was circumstantial and perseverative in thought process today frequently reverting back to discussing her new relationship.  Throughout the encounter patient was fidgeting in the form of bouncing her knee and picking her fingernails.  She did however make intermittent eye contact and was able to attend and focus on the majority of the evaluation.   Patient reports that since her last office visit with her former provider SARI Carlson she has been compliant with her prescribed medications including Lexapro 20 mg once daily, Intuniv 2 mg nightly, Concerta 54 mg once daily.     Per patient report, the primary complaint and recent symptoms leading up to this evaluation while taking medications as directed include: Worsening anxiety, increased frequency of panic attack, fluctuating motivation (per mother's report), heightened sensory input specifically when in loud environments.  Patient reports that her anxiety has been worsening over the last 3 months " "again in relation to her new relationship.    In terms of depression, the patient and her mother reports that she has been \"less blanchard\" while on Lexapro..  Patient denies being depressed at this time.  However, she is as stated above anxious about the state of her relationship and does become upset when she feels as though she cannot engage in \"normal\" relationship activities that others appear to have no issue with (i.e. kissing, cuddling).  Patient reports getting 8 or 9 hours of sleep and denies nightmares.  No reported issues with memory currently.  Patient says that when taking her medications specifically her Concerta her ability to focus is \"okay\".  Patient states that her appetite is not great in the morning but otherwise that she eats adequately throughout the day.  Patient denies any disordered eating habits.  Regarding her sleep patient added that the Intuniv has actually increased her sleep compared to before she was on this medication.  Patient says that she is able to get up every day and perform her necessary daily activities.  However, patient's mother mentioned multiple times that sometimes she feels that the \"mundane\" activities are hard to motivate the patient to do.  It was explained that this could be secondary to her ADHD however this symptom will continue to be monitored patient denies anhedonia and states that she enjoys engaging in her hobbies.  And interests such as art, Star Wars, spending time with her boyfriend.  Patient denies SI, HI, or thoughts to self harm.  Patient said that once in the past she had fleeting passive thoughts of death with no active plan or intent after watching something on television.  She quickly realized that this was not an option for her.  No other suicidal ideations reported.      In terms of bipolar disorder, the patient denies any current or past symptoms or episodes concerning for true hypomanic/manic symptoms or episodes.       Social Anxiety symptoms: " "social anxiety due to fear of judgment or embarassment and significant symptoms have been present for greater than 6 months.  Patient reports that her anxiety is mostly related to social situations rather than general anxiety.  When she has had panic attacks in the past they had been triggered rather than random.  Patient states that she has been having approximately 1 panic attack per week for the last 3 months since starting her relationship.  Prior to that she went a few years without a panic attack.  Patient reports that her social anxiety is frustrating because she does want to make friends and meet people, but her anxieties tend to limit that with the most recent example being the start of this new relationship with her significant other.  For example patient states that she was distressed by the fact that she kissed her boyfriend and almost immediately had a panic attack.    OCD Symptoms: Denies symptoms concerning for OCD.    In terms of PTSD, the patient denies any past physical/emotional/sexual abuse.  She did struggle in high school socially in addition to allegedly being ostracized/ while in high school for having an IEP and learning difficulties.  These high school experiences have led to a decrease in confidence which is also evident to the patient's mother.  Patient says that she still experiences these confidence issues even in community college currently.  No overt symptoms of PTSD were reported.      In terms of psychotic symptoms, the patient reports no true psychotic symptoms now or in the past.  However, when asked about AVH patient stated that she sometimes feels that she can \"hear things\" when in loud settings such as grocery stores.  She states that she has always been \"more aware of her surroundings and sensitive to noises/sounds.    Of note, patient and her mother state that she has never been diagnosed with social communication disorder or ASD in the past.  When asked about past " neuropsychological testing, mother reports that she vaguely remembers this testing being mentioned but does not remember specifics about the testing nor does she remember declining testing in the past.    Both the patient and her mother are open to medication management, psychotherapy, and neuropsychological testing moving forward.    Psychiatric Review Of Systems:    Pertinent items are noted in HPI; all others negative    Review Of Systems: Review of systems as noted above in HPI/Subjective. A relevant review of symptoms was otherwise negative    Current Rating Scores:     Current PHQ-9   PHQ-2/9 Depression Screening           Current JUDSON-7     Areas of Improvement: reviewed in HPI/Subjective Section and reviewed in Assessment and Plan Section      Historical Information      Past Psychiatric History:     Previous diagnoses include:   Per combination of record review and patient report she has a past psychiatric history of UJDSON versus social anxiety disorder, ADHD, learning disability, unspecified depressive disorder  Prior outpatient psychiatric treatment:  Past outpatient treatment by - Dr. Brendan Hamilton MD; Serena MÉNDEZ, Leda GUO, SARI Carlson (most recent provider last seen in December 2024)  Prior therapy:  Never been to formal psychotherapy  Prior inpatient psychiatric treatment:  None  Prior suicide attempts: Denies  Prior self harm: Denies  Prior violence or aggression: Denies  Previous psychotropic medication trials:   (Italicized portion is copied from SARI Carlson initial evaluation and confirmed by patient today, 6/24/2025; bolded is new information): Tenex (ineffective), Adderall (emotional and tearful, appetite suppression and insomnia), Strattera 25 mg (limited benefit, ineffective, more depressed), atarax, Concerta up to 54 mg daily (current, effective), Focalin (effective, but difficult to obtain), Intuniv (current, effective)    Traumatic History:   Abuse:no history of  "sexual abuse, no history of physical abuse, no history of emotional abuse  Other Traumatic Events: reports somewhat traumatic education history in high school in which she felt /ostracized from others because of her need for IEP    Family Psychiatric History:   Psychiatric Illness: Maternal side history of depression, bipolar disorder, anxiety, seizures  Substance Abuse: Deferred   Suicide Attempts/self-injurious behavior: Paternal cousin (niece attempted suicide; uncle has a hx of SIB  Family History[1]    Substance Use History:    Tobacco, Alcohol and Drug Use History     Tobacco Use    Smoking status: Never    Smokeless tobacco: Never   Vaping Use    Vaping status: Never Used   Substance Use Topics    Alcohol use: No    Drug use: No         Additional Substance Use Detail:  Alcohol use: Denies current use  Other substance use: Denies current use    Longest clean time: Not applicable  History of Inpatient/Outpatient rehabilitation program: Not applicable  Tobacco use: Denies current use    Social History:  Patient is an only child  Reports a fairly happy however \"sheltered\" childhood  Education level: Some college -currently still in school at [employer]  Current occupation: Part-time student at [employer]), [employer]  Marital status: Never  but in a romantic relationship that is reported to be healthy  Children: None  Current Living Situation: the patient currently lives with her parents and pet birds.   Social support: Family, significant other  Faith/Spiritual belief: Practicing Jew  Legal history: Denies  Access to Guns/weapons: Denies    Social History     Socioeconomic History    Marital status: Single     Spouse name: Not on file    Number of children: Not on file    Years of education: Not on file    Highest education level: Not on file   Occupational History    Not on file   Other Topics Concern    Not on file   Social History Narrative    Lives with Mom & " "Dad- no siblings    2nd year at [employer].    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        Pets/Animals: yes birds 4    /After School Program:yes 12 th grade In person    Carbon Monoxide/Smoke detectors in home: yes    Fire Place: no    Exposure to Mold: yes basement is not finished    Carpet in Home: yes    Stuffed Animals (Toys): yes sleeps with stuffed animals     Tobacco Use: Exposure to smoke no    E-Cigarette/Vaping: Exposure to E-Cigarette/Vaping no             Past Medical History[2]  Past Surgical History[3]  Allergies: Allergies[4]    Current Outpatient Medications   Medication Instructions    busPIRone (BUSPAR) 5 mg, Oral, 2 times daily    escitalopram (LEXAPRO) 20 mg, Oral, Daily    guanFACINE HCl ER (INTUNIV) 2 mg, Oral, Daily at bedtime    methylphenidate (CONCERTA) 54 mg, Oral, Daily    Multiple Vitamin (MULTI-VITAMIN DAILY PO) Take by mouth    omeprazole (PRILOSEC) 40 mg, Oral, Daily    polyethylene glycol (GLYCOLAX) 510 g, Oral, Daily, take 17 grams DISSOLVED IN GLASS OF LIQUID by mouth once daily, Normal    Sodium Fluoride 5000 PPM 1.1 % PSTE Use as directed        Medical History Reviewed by provider this encounter:         Objective   There were no vitals taken for this visit.     Mental Status Evaluation:    Appearance age appropriate, casually dressed, looks stated age   Behavior pleasant, cooperative, somewhat guarded at imte   Speech normal rate, spontaneous, soft, anxious tone   Mood \"Anxious\"   Affect Anxious edge   Thought Processes goal directed, concrete   Thought Content no overt delusions   Perceptual Disturbances: no auditory hallucinations, no visual hallucinations   Abnormal Thoughts  Risk Potential Suicidal ideation - None at present  Homicidal ideation - None at present  Potential for aggression - No   Orientation oriented to person, place, time/date, and situation   Memory Memory of timeline specifics  seems limited at times but otherwise memory appears " grossly intake    Consciousness alert and awake   Attention Span Concentration Span attention span and concentration appear shorter than expected for age   Intellect Patient reports past history of learning disability specifically related to her processing speed which at time did appear somewhat slower, but it was not formally tested today   Insight limited   Judgement fair   Muscle Strength and  Gait normal gait and normal balance   Motor activity Fidgeting (bouncing knee and picking fingernails) throughout evaluation, but no other abnormal movements observed.          Laboratory Results: I have personally reviewed all pertinent laboratory/tests results    Most Recent Labs:   Lab Results   Component Value Date    WBC 5.37 10/28/2022    RBC 4.74 10/28/2022    HGB 14.4 10/28/2022    HCT 41.7 10/28/2022     10/28/2022    RDW 11.7 10/28/2022    NEUTROABS 2.47 10/28/2022    SODIUM 137 10/28/2022    K 3.6 10/28/2022     10/28/2022    CO2 26 10/28/2022    BUN 15 10/28/2022    CREATININE 0.83 10/28/2022    CALCIUM 10.0 10/28/2022    AST 17 10/28/2022    ALT 24 10/28/2022    ALKPHOS 72 10/28/2022    TP 8.4 10/28/2022    TBILI 0.31 10/28/2022       Suicide/Homicide Risk Assessment:    Risk of Harm to Self:  The following ratings are based on assessment at the time of the interview and review of records  Demographic Risk Factors include: , age: young adult (15-24)  Historical Risk Factors include: chronic psychiatric problems, chronic anxiety symptoms, history of anxiety  Recent Specific Risk Factors include: current anxiety symptoms, significant anxiety symptoms, social anxiety  Protective Factors: no current suicidal ideation, able to make plans for the future, Restoration beliefs discouraging suicide, resiliency, supportive family  Weapons/Firearms: none. The following steps have been taken to ensure weapons are properly secured: not applicable  Based on today's assessment, Kathleen presents the following  "risk of harm to self: none    Risk of Harm to Others:  The following ratings are based on assessment at the time of the interview and review of records  Demographic Risk Factors include: 16-25 years of age  Historical Risk Factors include: none  Recent Specific Risk Factors include: none  Protective Factors: access to mental health treatment, moral system, responsibilities and duties to others, restricted access to lethal means, safe and stable living environment  Weapons/Firearms: none. The following steps have been taken to ensure weapons are properly secured: not applicable  Based on today's assessment, Kathleen presents the following risk of harm to others: none    The following interventions are recommended:   Continue medication management. Referral for psychological testing    Treatment Plan:    Completed and signed during the session: Unable to complete crisis plan and treatment plan during today's session secondary to time constraints. No immediate safety concerns. Will complete thsi documanetation at next visit.     Depression Follow-up Plan Completed: Not applicable    Note Share: This note was shared with patient.    Administrative Statements   Administrative Statements   I have spent a total time of 90 minutes in caring for this patient on the day of the visit/encounter including Risks and benefits of tx options, Instructions for management, Patient and family education, Importance of tx compliance, Counseling / Coordination of care, Documenting in the medical record, Reviewing/placing orders in the medical record (including tests, medications, and/or procedures), Obtaining or reviewing history  , and Communicating with other healthcare professionals .    Visit Time  Visit Start Time: 1:45 pm  Visit Stop Time: 3:15 pm  Total Visit Duration: 90 minutes    Portions of the record may have been created with voice recognition software. Occasional wrong word or \"sound a like\" substitutions may have occurred due " to the inherent limitations of voice recognition software. Read the chart carefully and recognize, using context, where substitutions have occurred.    Genia Andino DO 06/25/25         [1]   Family History  Problem Relation Name Age of Onset    No Known Problems Mother      No Known Problems Father      Cancer Maternal Grandmother      Colon cancer Maternal Grandmother      Cancer Maternal Grandfather      Diabetes Maternal Grandfather      Hypertension Maternal Grandfather      Thyroid disease Maternal Grandfather      Other Maternal Grandfather          Thyroid Disorder    Dementia Maternal Grandfather      Hypertension Paternal Grandfather      Heart disease Paternal Grandfather      Osteoporosis Paternal Grandfather      Stroke Paternal Grandfather      Coronary artery disease Paternal Grandfather      Migraines Neg Hx      Learning disabilities Neg Hx     [2]   Past Medical History:  Diagnosis Date    Anxiety     CASTAÑEDA (dyspnea on exertion) 8/15/2021    GERD (gastroesophageal reflux disease)     Psychiatric disorder     Tinea versicolor 12/26/2021   [3]   Past Surgical History:  Procedure Laterality Date    DENTAL IMPLANT Left 08/2024    DENTAL SURGERY  2017    several baby teeth pulled    UPPER GASTROINTESTINAL ENDOSCOPY      WISDOM TOOTH EXTRACTION     [4] No Known Allergies

## 2025-06-24 NOTE — BH TREATMENT PLAN
"TREATMENT PLAN (Medication Management Only)        [facility]    Name and Date of Birth:  Kathleen Guy 19 y.o. 2005  MRN: [id number]  Date of Treatment Plan: June 24, 2025  Diagnosis/Diagnoses:    No diagnosis found.  Strengths/Personal Resources for Self-Care: {AMB PATIENT STRENGTHS:63022}.  Area/Areas of need (in own words): {AMB PATIENT AREAS OF NEED:78536}  1. Long Term Goal:   {AMB LONG TERM GOALS:69218}.  Target Date:{AMB TREATMENT PLAN TARGET DATE:34824}  Person/Persons responsible for completion of goal: {AMB Person TT Plan:39150}  2.  Short Term Objective (s) - How will we reach this goal?:   A.  Provider new recommended medication/dosage changes and/or continue medication(s): {AMB SHORT TERM OBJECTIVE MEDS:80367}.  B.  {AMB SHORT TERM OTHER OBJECTIVES:07448::\"N/A\"}.  C.  {AMB SHORT TERM OTHER OBJECTIVES:56482::\"N/A\"}.  Target Date:{AMB TREATMENT PLAN TARGET DATE:34824}  Person/Persons Responsible for Completion of Goal: {AMB Person TT Plan:99866}  Progress Towards Goals: {AMB Progress Towards Goals TT Plan:41734}  Treatment Modality: {AMB TREATMENT MODALITY:14684}  Review due 180 days from date of this plan: {AMB TREATMENT PLAN REVIEW DUE:37972}  Expected length of service: {AMB LOS:06608}  My Physician/PA/NP and I have developed this plan together and I agree to work on the goals and objectives. I understand the treatment goals that were developed for my treatment.   Electronic Signatures: on file (unless signed below)    Genia Andino DO 06/24/25  "

## 2025-06-24 NOTE — ASSESSMENT & PLAN NOTE
Kathleen Guy is a 19 y.o. female,  never , currently in romantic relationship with male, domiciled with parents and pet birds, currently employed, w/ PMH of GERD, cardiac murmur (reported by patient to be stable at this time), bedwetting (reported as resolved), intermittent intractable headaches and PPH of social anxiety disorder versus JUDSON, ADHD, unspecified depressive disorder, and learning disability, no prior psychiatric admissions, no prior SA, no  h/o self-injurious behavior,  who  presents in the outpatient office today for psychiatric evaluation and transfer of care appointment in the setting of ongoing ADHD  and social anxiety treatment with recent worsening of anxiety secondary to new life changes/stressors.  Patient reports that since her last office visit with her former provider SARI Carlson she has been compliant with her prescribed medications including Lexapro 20 mg once daily, Intuniv 2 mg nightly, Concerta 54 mg once daily.     Per patient report, the primary complaint and recent symptoms leading up to this evaluation while taking medications as directed include: Worsening anxiety, increased frequency of panic attack, fluctuating motivation (per mother's report), heightened sensory input specifically when in loud environments.  No overt signs or reports of psychosis, bipolar, OCD, eating disorder.  Both the patient and her mother are open to medication management, psychotherapy, and neuropsychological testing moving forward.    PLAN:  Continue Concerta 54 mg once daily for ADHD symptoms  Continue Intuniv 2 mg nightly for ADHD symptoms and impulsivity   Continue Lexapro 20 mg once daily for anxiety and related depression like symptoms  Initiate BuSpar 5 mg twice daily for adjunct treatment of anxiety  Referral for psychotherapy placed  Referral for psychological testing placed  Follow-up medication management visit in 1 month  Patient instructed to call office in two weeks to  discuss progress with anxiety on buspar.       Orders:    busPIRone (BUSPAR) 5 mg tablet; Take 1 tablet (5 mg total) by mouth 2 (two) times a day    escitalopram (LEXAPRO) 20 mg tablet; Take 1 tablet (20 mg total) by mouth daily    Ambulatory referral to Psych Services; Future

## 2025-06-24 NOTE — ASSESSMENT & PLAN NOTE
Management per principle problem     Orders:    guanFACINE HCl ER (INTUNIV) 2 MG TB24; Take 1 tablet (2 mg total) by mouth daily at bedtime    methylphenidate (CONCERTA) 54 MG ER tablet; Take 1 tablet (54 mg total) by mouth daily Max Daily Amount: 54 mg    Ambulatory referral to Psych Services; Future

## 2025-06-24 NOTE — BH CRISIS PLAN
Client Name: Kathleen Guy       Client YOB: 2005    Elena Safety Plan         Step 1: Warning Signs:            Step 2: Internal Coping Strategies:            Step 3: People and social settings that provide distraction:            Step 4: People whom I can ask for help during a crisis:      Step 5: Professionals or agencies I can contact during a crisis:        Crisis Phone Numbers:   Suicide Prevention Lifeline: Call or Text  740 Crisis Text Line: Text HOME to 685-785   Please note: Some [facility] do not have a separate number for Child/Adolescent specific crisis. If your county is not listed under Child/Adolescent, please call the adult number for your county      Adult Crisis Numbers: Child/Adolescent Crisis Numbers   [facility]: [phone] [facility]: [phone]   [facility]: [phone] [facility]: [phone]   [facility]: [phone] [place]: [phone]   [facility]: [phone] [facility]: [phone]   [facility]: [phone]   [facility]: [phone]   [facility]: [phone]   [facility]: [phone] (daytime) [phone] (after hours, weekends, holidays)      Step 6: Making the environment safer (plan for lethal means safety):      Optional: What is most important to me and worth living for?      [place]lena Safety Plan. Chasity Wells and Ian Adams. Used with permission of the authors.

## 2025-06-25 ENCOUNTER — TELEPHONE (OUTPATIENT)
Age: 20
End: 2025-06-25

## 2025-06-25 NOTE — PATIENT INSTRUCTIONS
Continue Concerta 54 mg once daily for ADHD symptoms  Continue Intuniv 2 mg nightly for ADHD symptoms and impulsivity   Continue Lexapro 20 mg once daily for anxiety and related depression like symptoms    Start BuSpar 5 mg twice daily for adjunct treatment of anxiety  Referral for psychotherapy placed  Referral for psychological testing placed  Follow-up medication management visit in 1 month  Please call office in two weeks to discuss progress with anxiety on buspar.

## 2025-06-25 NOTE — ASSESSMENT & PLAN NOTE
"Patient reports during evaluation today that she has history of a \"processing\" learning disability. She reports having an IEP in school. She reports having a history of \"processing\" things \"slowly\"    Recommend neuropsychological testing as described above       "

## 2025-06-25 NOTE — TELEPHONE ENCOUNTER
Patient's mom called and reported that pt. Has a referral for neuropsych testing. Please call back to discuss further.

## 2025-06-25 NOTE — TELEPHONE ENCOUNTER
Pts mother called back about the text she received about the neuropsychology referral. Writer checked the patients chart and informed her the patient was added to the wait list for both referrals and will be called when there is availability to schedule.

## 2025-06-29 DIAGNOSIS — K21.9 GASTROESOPHAGEAL REFLUX DISEASE WITHOUT ESOPHAGITIS: ICD-10-CM

## 2025-06-29 DIAGNOSIS — R10.13 EPIGASTRIC PAIN: ICD-10-CM

## 2025-06-29 DIAGNOSIS — K59.09 CHRONIC CONSTIPATION: ICD-10-CM

## 2025-06-30 RX ORDER — POLYETHYLENE GLYCOL 3350 17 G/17G
510 POWDER, FOR SOLUTION ORAL DAILY
Qty: 15300 G | Refills: 0 | Status: SHIPPED | OUTPATIENT
Start: 2025-06-30

## 2025-07-09 ENCOUNTER — TELEPHONE (OUTPATIENT)
Age: 20
End: 2025-07-09

## 2025-07-09 NOTE — TELEPHONE ENCOUNTER
Patient called and reported that she wanted to speak with provider regarding her medication. Please call back.

## 2025-07-10 NOTE — TELEPHONE ENCOUNTER
Called Kathleen ([phone]) she wanted to update Dr. Andino. She has taken Buspar for about two weeks and doesn't know if it is helping yet. She said she hasn't had a panic attack yet and usually feels nervous/anxious when she sees her boyfriend. She is having a sleep over with him on Friday and said this will be time for her to see how the medication works. I reminded her that medication can take up to 6 weeks for her to notice full therapeutic benefit. She verbalized understanding and will call us Monday to provide an update on how her weekend went.

## 2025-07-24 ENCOUNTER — OFFICE VISIT (OUTPATIENT)
Dept: PSYCHIATRY | Facility: CLINIC | Age: 20
End: 2025-07-24
Payer: COMMERCIAL

## 2025-07-24 DIAGNOSIS — F40.10 SOCIAL ANXIETY DISORDER: Primary | ICD-10-CM

## 2025-07-24 DIAGNOSIS — F90.0 ATTENTION DEFICIT HYPERACTIVITY DISORDER (ADHD), PREDOMINANTLY INATTENTIVE TYPE: ICD-10-CM

## 2025-07-24 PROCEDURE — 99214 OFFICE O/P EST MOD 30 MIN: CPT

## 2025-07-24 PROCEDURE — 90833 PSYTX W PT W E/M 30 MIN: CPT

## 2025-07-24 NOTE — PSYCH
Behavioral Health Psychotherapy Assessment    Date of Initial Psychotherapy Assessment: 07/24/25  Referral Source: ***  Has a release of information been signed for the referral source? {Yes/No/NA:93925}    Preferred Name: Kathleen Guy  Preferred Pronouns: {Preferred Pronouns:20243}  YOB: 2005 Age: 19 y.o.  Sex assigned at birth: {SL Sex Assigned at Birth:4397775353}   Gender Identity: ***  Race: {Race/ethnicity:95349}  Preferred Language: {Misc; languages:51508}    Emergency Contact:  Full Name: ***  Relationship to Client: ***  Contact information: ***    Primary Care Physician:  Kristin Mora MD  [address] [place] [address]  [phone]  Has a release of information been signed? {Yes/No/NA:42023}    Physical Health History:  Past surgical procedures: ***  Do you have a history of any of the following: {SL Amb Psych Physical Health:31442}  Do you have any mobility issues? {YES-DESCRIBE/NO:77289}  Developmental History: ***    Relevant Family History:  ***    Presenting Problem (What brings you in?)  ***    Mental Health Advance Directive:  Do you currently have a Mental Health Advance Directive?{YES/NO:20200}    Diagnosis:  No diagnosis found.    Initial Assessment:     Social Determinants of Health:    SDOH:  None        Visit start and stop times:    07/24/25

## 2025-07-24 NOTE — PSYCH
MEDICATION MANAGEMENT NOTE    Name: Kathleen Guy      : 2005      MRN: [id number]  Encounter Provider: Genia Andino DO  Encounter Date: 2025   Encounter department: [facility]    Insurance: Payor: BLUE CROSS / Plan: INDEPENDENCE PERSONAL CHOICE / Product Type: Blue PPO /      Reason for Visit:   Chief Complaint   Patient presents with    Anxiety    ADHD    Medication Management   :  Assessment & Plan  Social anxiety disorder vs JUDSON (hisotrical dx)  On initial evaluation on 2025: Kathleen Guy is a 19 y.o. female,  never , currently in romantic relationship with male, domiciled with parents and pet birds, currently employed, w/ PMH of GERD, cardiac murmur (reported by patient to be stable at this time), bedwetting (reported as resolved), intermittent intractable headaches and PPH of social anxiety disorder versus JUDSON, ADHD, unspecified depressive disorder, and learning disability, no prior psychiatric admissions, no prior SA, no  h/o self-injurious behavior,  who  presents in the outpatient office today for psychiatric evaluation and transfer of care appointment in the setting of ongoing ADHD  and social anxiety treatment with recent worsening of anxiety secondary to new life changes/stressors.  Patient reports that since her last office visit with her former provider SARI Carlson she has been compliant with her prescribed medications including Lexapro 20 mg once daily, Intuniv 2 mg nightly, Concerta 54 mg once daily.     Per patient report, the primary complaint and recent symptoms leading up to this evaluation while taking medications as directed include: Worsening anxiety, increased frequency of panic attack, fluctuating motivation (per mother's report), heightened sensory input specifically when in loud environments.  No overt signs or reports of psychosis, bipolar, OCD, eating disorder.  Both the patient and her mother are open to  "medication management, psychotherapy, and neuropsychological testing moving forward.      On reevaluation on 7/24/2025:  Kathleen Guy reports that overall she has been \"doing pretty well\" since last appointment. Kathleen Guy appears slightly less anxious compared to previous/initial evaluation, but still exhibits certain aspects of anxiety and inattention including moments of distraction and loss of eye contact, bouncing knee during evaluation, fatigue from length of appointment. They endorse partial response to current psychotropic regimen with significant decrease in frequency of panic attacks denying the occurrence of a major panic attacks since last appointment.  Patient reports some improvement in the anxieties related to her new relationship.  However, patient still believes there is room for further management of her anxiety overall.  At the time of the interview, the patient denies suicidal or homicidal ideation, intent or plan, denies auditory or visual hallucinations or other perceptual disturbances. Psychopharmacologic education provided in addition to supportive therapy.  Risks and benefits of proposed treatment were discussed with the patient at which time they expressed understanding and were amenable to recommendations. The patient is in agreement with the treatment plan as detailed below, and agrees to call the office with any concerns or side effects between appointments.  Patient is also in agreement with initiating therapy with this writer in addition to continuing medication management with this writer.    PLAN:  Continue Concerta 54 mg once daily for ADHD symptoms  Continue Intuniv 2 mg nightly for ADHD symptoms and impulsivity   Continue Lexapro 20 mg once daily for anxiety and related depression like symptoms  Increase BuSpar to 10 mg twice daily for adjunct treatment of anxiety  Initiate therapy with this writer, Genia Andino, DO  Referral for psychological testing placed at last " appointment  On further review of patient's media/a bloated documentations it appears that the patient received psychological testing in 2015 and 2021 (2021 assessment results a uploaded to media) which showed diagnosis of JUDSON and ADHD  At this CURRENT time there may be limited benefit to going through further testing for social communication disorder versus ASD especially considering that this writer would very likely continue with similar course of treatment with medication management and therapy.   Will discuss with patient and mother about whether or not they want to continue with pursuit of further psychological testing at this time or hold off at this time.  Follow-up medication management visit in 6 month          Attention deficit hyperactivity disorder (ADHD), predominantly inattentive type  Management per principle problem              Treatment Recommendations:    Educated about diagnosis and treatment modalities. Verbalizes understanding and agreement with the treatment plan.  Discussed self monitoring of symptoms, and symptom monitoring tools.  Discussed medications and if treatment adjustment was needed or desired.  Medication management every 1-3 months  Aware of 24 hour and weekend coverage for urgent situations accessed by calling [facility] main practice number  Psychotherapy with this writer, Genia Andino, DO   I am scheduling this patient out for greater than 3 months: No    Medications Risks/Benefits:      Risks, Benefits And Possible Side Effects Of Medications:    Risks, benefits, and possible side effects of medications explained to Kathleen and she (or legal representative) verbalizes understanding and agreement for treatment.    Controlled Medication Discussion:     Kathleen has been filling controlled prescriptions on time as prescribed according to [place] Prescription Drug Monitoring Program.      History of Present Illness     SUBJECTIVE:  Kathleen Guy is  a 19 y.o. female, possessing a past psychiatric history significant for unspecified depressive disorder, ADHD, JUDSON versus social anxiety disorder, who was personally seen and evaluated at the [facility] outpatient clinic for follow-up regarding medication management.  At their last visit, the plan was to continue Intuniv 2 mg nightly, Concerta 50 mg once daily, Lexapro 20 mg once daily, and initiate BuSpar 5 mg twice daily.  Per patient reports she has been compliant with this plan since last visit.  Since our last visit, overall symptoms have been improvement in her anxiety especially in regards to decrease in frequency of panic attacks.  Patient reports that she has not had a major panic attack since last appointment.  Despite not having a panic attack patient reports that her anxiety is still there especially regarding social situations like navigating the new relationship that she has with her boyfriend.  Patient continues to maintain that this is a safe and healthy relationship but still requires some adjustment because patient is new to this type of romantic relationship.  Patient reports that the new sensory experiences including active intimacy have become less startling and more enjoyable since last visit.  Patient believes that there is still room to improve on her anxiety moving forward.  On assessment they appear slightly less anxious and brighter compared to last visit.  Today's evaluation was conducted mostly without patient's mother in the room.  Though patient did not reveal any new information compared to when her mother was in the room for her last appointment, patient did appear more able to converse with this writer.  Patient reports that the major difference in her mother's perception of her symptoms and the patient's subjective reports are that the patient does not believe she struggles with motivation like her mother believes she does.  Patient denies any major  issues with her ADLs, schoolwork, or job.  Patient does mention though that returning to school in person versus virtual classes which she did last semester will be better for her overall performance.  When asked if she believes the Lexapro has been helpful for her patient was transparent about the fact that she has been on it for so long that she is unable to really say the specific ways that it is helped her.  However, patient states that her parents feel as though there have been positive differences since she has started Lexapro.  Patient says that she would be nervous to go off of Lexapro altogether without an alternative medication.  At the time of the interview, the patient denies suicidal or homicidal ideation, intent or plan, denies auditory or visual hallucinations or other perceptual disturbances. Psychopharmacologic education provided in addition to supportive therapy.  Risks and benefits of proposed treatment were discussed with the patient at which time they expressed understanding and were amenable to recommendations.    At conclusion of evaluation, patient is amenable to the recommendations of this writer including: continue psychotropic medications as prescribed.  Also, patient is amenable to calling/contacting the outpatient office including this writer if any acute adverse effects of their medication regimen arise in addition to any comments or concerns pertaining to their psychiatric management.  Patient is amenable to calling/contacting crisis and/or attending to the nearest emergency department if their clinical condition deteriorates to assure their safety and stability.  Patient is also amenable to initiating psychotherapy with this writer moving forward.  Initially there was a psychotherapy appointment scheduled and attached to the patient's medication management appointment today, but after conducting medication management appointment patient said that she would prefer to have a break and  not do therapy and medication management back-to-back.      HPI PSYCH ROS:   Medication Side Effects: denies  Anxiety: reports slight improvement as evidenced by no major panic attacks since last visit  Safety concerns (SI, HI, others): denies  AVH: denies  Sleep: adequate  Energy: adequate  Appetite: no changes   Weight Change: denies       Review Of Systems: Review of systems as noted above in HPI/Subjective. A relevant review of symptoms was otherwise negative    Current Rating Scores:     Current JUDSON-7  = 6     Areas of Improvement: reviewed in HPI/Subjective Section and reviewed in Assessment and Plan Section    Review of Historical Information:  History was reviewed with patient at today's visit.   Unchanged information from  this writer's previous assessment is copied and italicized; information that has changed is bolded.     Past Psychiatric History:   Previous diagnoses include:   Per combination of record review and patient report she has a past psychiatric history of JUDSON versus social anxiety disorder, ADHD, learning disability, unspecified depressive disorder  Psychological testing in 2015 and 2021. 2021 records list JUDSON and ADHD.  Prior outpatient psychiatric treatment:  Past outpatient treatment by - Dr. Brendan Hamilton MD; Serena MÉNDEZ, Leda GUO, SARI Carlson (most recent provider last seen in December 2024)  Prior therapy:  Never been to formal psychotherapy  Prior inpatient psychiatric treatment:  None  Prior suicide attempts: Denies  Prior self harm: Denies  Prior violence or aggression: Denies  Previous psychotropic medication trials:   (Italicized portion is copied from SARI Carlson initial evaluation and confirmed by patient today, 6/24/2025; bolded is new information): Tenex (ineffective), Adderall (emotional and tearful, appetite suppression and insomnia), Strattera 25 mg (limited benefit, ineffective, more depressed), atarax, Concerta up to 54 mg daily (current,  "effective), Focalin (effective, but difficult to obtain), Intuniv (current, effective), Buspar (current)     Traumatic History:   Abuse:no history of sexual abuse, no history of physical abuse, no history of emotional abuse  Other Traumatic Events: reports somewhat traumatic education history in high school in which she felt /ostracized from others because of her need for IEP     Family Psychiatric History:   Psychiatric Illness: Maternal side history of depression, bipolar disorder, anxiety, seizures  Substance Abuse: Deferred   Suicide Attempts/self-injurious behavior: Paternal cousin (niece attempted suicide; uncle has a hx of SIB     Substance Use History:  Alcohol use: Denies current use  Other substance use: Denies current use    Longest clean time: Not applicable  History of Inpatient/Outpatient rehabilitation program: Not applicable  Tobacco use: Denies current use     Social History:  Patient is an only child  Reports a fairly happy however \"sheltered\" childhood  Education level: Some college -currently still in school at [employer]  Current occupation: Part-time student at [employer]), [email or web address]  Marital status: Never  but in a romantic relationship that is reported to be healthy  Children: None  Current Living Situation: the patient currently lives with her parents and pet birds.   Social support: Family, significant other  Baptism/Spiritual belief: Practicing Anabaptism  Legal history: Denies  Access to Guns/weapons: Denies     Past Medical History[1]  Past Surgical History[2]  Allergies: Allergies[3]    Current Outpatient Medications   Medication Instructions    busPIRone (BUSPAR) 10 mg, Oral, 2 times daily    escitalopram (LEXAPRO) 20 mg, Oral, Daily    guanFACINE HCl ER (INTUNIV) 2 mg, Oral, Daily at bedtime    methylphenidate (CONCERTA) 54 mg, Oral, Daily    Multiple Vitamin (MULTI-VITAMIN DAILY PO) Take by mouth    omeprazole (PRILOSEC) 40 mg, Oral, Daily    " polyethylene glycol (GLYCOLAX) 510 g, Oral, Daily, take 17 grams DISSOLVED IN GLASS OF LIQUID by mouth once daily, Normal    Sodium Fluoride 5000 PPM 1.1 % PSTE Use as directed        Medical History Reviewed by provider this encounter:  Tobacco  Allergies  Meds  Problems  Med Hx  Surg Hx  Fam Hx          Objective   There were no vitals taken for this visit.     Mental Status Evaluation:  Appearance:  female, alert, adequate grooming and hygiene, casually dressed, wearing glasses, appears stated age, thin , tall   Behavior:  cooperative, sitting comfortably, intermittent eye contact , less guarded, knee bouncing   Speech:  spontaneous, normal rate, anxious tone   Mood:  Reports anxious is still present but has improved slightly   Affect:  Anxious but less so then previous evaluation, reactive, brighter   Thought Process:  goal-directed, concrete   Thought Content:  no verbalized delusions or overt paranoia, ruminating thoughts about relationship but less than previous evaluation    Perceptual Disturbances: denies current auditory or visual hallucinations   Risk Potential: Suicidal ideation -  Denies at present   Homicidal ideation - Denies at present  Potential for aggression - No   Sensorium:  oriented to person, place, and situation   Memory:  recent and remote memory grossly intact   Consciousness:  alert, awake   Attention/Concentration: attention span and concentration appear shorter than expected for age   Insight:  improving   Judgment: fair   Gait/Station: normal gait/station   Motor Activity: Fidgeting (knee bouncing) throughout interview          Laboratory Results: I have personally reviewed all pertinent laboratory/tests results    Most Recent Labs:   Lab Results   Component Value Date    WBC 5.37 10/28/2022    RBC 4.74 10/28/2022    HGB 14.4 10/28/2022    HCT 41.7 10/28/2022     10/28/2022    RDW 11.7 10/28/2022    NEUTROABS 2.47 10/28/2022    SODIUM 137 10/28/2022    K 3.6 10/28/2022      10/28/2022    CO2 26 10/28/2022    BUN 15 10/28/2022    CREATININE 0.83 10/28/2022    CALCIUM 10.0 10/28/2022    AST 17 10/28/2022    ALT 24 10/28/2022    ALKPHOS 72 10/28/2022    TP 8.4 10/28/2022    TBILI 0.31 10/28/2022       Suicide/Homicide Risk Assessment:     Risk of Harm to Self:  The following ratings are based on assessment at the time of the interview and review of records  Demographic Risk Factors include: , age: young adult (15-24)  Historical Risk Factors include: chronic psychiatric problems, chronic anxiety symptoms, history of anxiety  Recent Specific Risk Factors include: current anxiety symptoms, anxiety symptoms, social anxiety  Protective Factors: no current suicidal ideation, able to make plans for the future, Tenriism beliefs discouraging suicide, resiliency, supportive family, slight improvement in anxiety   Weapons/Firearms: none. The following steps have been taken to ensure weapons are properly secured: not applicable  Based on today's assessment, Kathleen presents the following risk of harm to self: minimal to none      Risk of Harm to Others:  The following ratings are based on assessment at the time of the interview and review of records  Demographic Risk Factors include: 16-25 years of age  Historical Risk Factors include: none  Recent Specific Risk Factors include: none  Protective Factors: access to mental health treatment, moral system, responsibilities and duties to others, restricted access to lethal means, safe and stable living environment  Weapons/Firearms: none. The following steps have been taken to ensure weapons are properly secured: not applicable  Based on today's assessment, Kathleen presents the following risk of harm to others: none     The following interventions are recommended:   Continue medication management. Plan to start psychotherapy.     Psychotherapy Provided:     Individual psychotherapy provided: Yes    Medications, treatment progress and  "treatment plan reviewed with Kathleen.  Medication changes discussed with Kathleen.  Medication education provided to Kathleen.  Supportive therapy provided.   Reassurance and supportive therapy provided.   Crisis/safety plan discussed with Kathleen.    Treatment Plan:    Completed and signed during the session: Yes - with Kathleen.    Goals: Progress towards Treatment Plan goals - Yes, progressing, as evidenced by subjective findings in HPI/Subjective Section and in Assessment and Plan Section    Depression Follow-up Plan Completed: Not applicable    Note Share:    This note was shared with patient.    Administrative Statements   Administrative Statements   I have spent a total time of 60 minutes in caring for this patient on the day of the visit/encounter including Diagnostic results, Prognosis, Risks and benefits of tx options, Instructions for management, Patient and family education, Importance of tx compliance, Risk factor reductions, Impressions, Counseling / Coordination of care, Documenting in the medical record, Reviewing/placing orders in the medical record (including tests, medications, and/or procedures), and Obtaining or reviewing history  .    Visit Time  Visit Start Time: 8:45  Visit Stop Time: 9:45  Total Visit Duration: 60 minutes     Portions of the record may have been created with voice recognition software. Occasional wrong word or \"sound a like\" substitutions may have occurred due to the inherent limitations of voice recognition software. Read the chart carefully and recognize, using context, where substitutions have occurred.    Genia Andino DO 07/24/25         [1]   Past Medical History:  Diagnosis Date    Anxiety     CASTAÑEDA (dyspnea on exertion) 8/15/2021    GERD (gastroesophageal reflux disease)     Psychiatric disorder     Tinea versicolor 12/26/2021   [2]   Past Surgical History:  Procedure Laterality Date    DENTAL IMPLANT Left 08/2024    DENTAL SURGERY  2017    several baby teeth pulled    " UPPER GASTROINTESTINAL ENDOSCOPY      WISDOM TOOTH EXTRACTION     [3] No Known Allergies

## 2025-07-24 NOTE — BH TREATMENT PLAN
"TREATMENT PLAN (Medication Management Only)        [facility]    Name and Date of Birth:  Kathleen Guy 19 y.o. 2005  MRN: [id number]  Date of Treatment Plan: July 24, 2025  Diagnosis/Diagnoses:    No diagnosis found.  Strengths/Personal Resources for Self-Care: {AMB PATIENT STRENGTHS:84702}.  Area/Areas of need (in own words): {AMB PATIENT AREAS OF NEED:74401}  1. Long Term Goal:   {AMB LONG TERM GOALS:72849}.  Target Date:{AMB TREATMENT PLAN TARGET DATE:34824}  Person/Persons responsible for completion of goal: {AMB Person TT Plan:79690}  2.  Short Term Objective (s) - How will we reach this goal?:   A.  Provider new recommended medication/dosage changes and/or continue medication(s): {AMB SHORT TERM OBJECTIVE MEDS:43005}.  B.  {AMB SHORT TERM OTHER OBJECTIVES:09646::\"N/A\"}.  C.  {AMB SHORT TERM OTHER OBJECTIVES:24831::\"N/A\"}.  Target Date:{AMB TREATMENT PLAN TARGET DATE:34824}  Person/Persons Responsible for Completion of Goal: {AMB Person TT Plan:93605}  Progress Towards Goals: {AMB Progress Towards Goals TT Plan:47173}  Treatment Modality: {AMB TREATMENT MODALITY:01256}  Review due 180 days from date of this plan: {AMB TREATMENT PLAN REVIEW DUE:66998}  Expected length of service: {AMB LOS:76515}  My Physician/PA/NP and I have developed this plan together and I agree to work on the goals and objectives. I understand the treatment goals that were developed for my treatment.   Electronic Signatures: on file (unless signed below)    Genia Andino DO 07/24/25  "

## 2025-07-24 NOTE — BH CRISIS PLAN
Client Name: Kathleen Guy       Client YOB: 2005    [place] Safety Plan      Creation Date: 7/24/25 Update Date: 7/24/25    Last Updated By: Genia Andino DO      Step 1: Warning Signs:   Warning Signs   stop eatting   hand fidgeting   increased anxiety   decreased talking            Step 2: Internal Coping Strategies:   Internal Coping Strategies   going outside   exercising   doing things to distract            Step 3: People and social settings that provide distraction:   Name Contact Information   boyfriend in phone   dad in phone    Places   outside, nature, withd animals           Step 4: People whom I can ask for help during a crisis:      Name Contact Information    Father in phone    Mother in phone    Boyfriend in phone      Step 5: Professionals or agencies I can contact during a crisis:      [facility]inican/Agency Name Phone Emergency Contact    Genia Andino  [phone]        Local Emergency Department Emergency Department Phone Emergency Department Address    [place] [address]         Crisis Phone Numbers:   Suicide Prevention Lifeline: Call or Text  985 Crisis Text Line: Text HOME to 520-025   Please note: Some [facility] do not have a separate number for Child/Adolescent specific crisis. If your county is not listed under Child/Adolescent, please call the adult number for your county      Adult Crisis Numbers: Child/Adolescent Crisis Numbers   [facility]: [phone] [facility]: [phone]   [facility]: [phone] [facility]: [phone]   [facility]: [phone] [place]: [phone]   [facility]: [phone] [place]: [phone]   [place]: [phone]   [facility]: [phone]   [facility]: [phone]   [facility]: [phone] (daytime) [phone] (after hours, weekends, holidays)      Step 6: Making the environment safer (plan for lethal means  safety):   Patient did not identify any lethal methods: Yes     Optional: What is most important to me and worth living for?   Boyfriend, clair - Confucianism, birds, family, artwork      Elena Safety Plan. Chasity Wells and Ian Adams. Used with permission of the authors.

## 2025-07-25 NOTE — BH TREATMENT PLAN
TREATMENT PLAN (Medication Management Only)        [facility]    Name and Date of Birth:  Kathleen Guy 19 y.o. 2005  MRN: [id number]  Date of Treatment Plan: July 25, 2025  Diagnosis/Diagnoses:    1. 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 Attention deficit hyperactivity disorder (ADHD), predominantly inattentive type      Strengths/Personal Resources for Self-Care: supportive family, supportive friends, taking medications as prescribed, ability to adapt to life changes, ability to communicate well, ability to listen, ability to reason, family ties, good physical health, motivation for treatment, [employer] affiliation, stable employment, strong clair.  Area/Areas of need (in own words): anxiety symptoms, ADHD symptoms, relationships, self-esteem  1. Long Term Goal:   continue improvement in anxiety, continue improvement in ADHD symptoms, build self esteem and self assurance.  Target Date:6 months - 1/25/2026  Person/Persons responsible for completion of goal: Kathleen  2.  Short Term Objective (s) - How will we reach this goal?:   A.  Provider new recommended medication/dosage changes and/or continue medication(s): continue current medications as prescribed.  B.  Working on navigating relationships especially new ones .  C.  Work on insecurities.  Target Date:6 months - 1/25/2026  Person/Persons Responsible for Completion of Goal: Kathleen  Progress Towards Goals: continuing treatment  Treatment Modality: medication management every 1-3 months  Review due 180 days from date of this plan: 6 months - 1/25/2026  Expected length of service: ongoing treatment unless revised  My Physician/PA/NP and I have developed this plan together and I agree to work on the goals and objectives. I understand the treatment goals that were developed for my treatment.   Electronic Signatures: on file (unless signed below)    Genia Andino DO 07/25/25

## 2025-07-25 NOTE — ASSESSMENT & PLAN NOTE
"On initial evaluation on 6/24/2025: Kathleen Guy is a 19 y.o. female,  never , currently in romantic relationship with male, domiciled with parents and pet birds, currently employed, w/ PMH of GERD, cardiac murmur (reported by patient to be stable at this time), bedwetting (reported as resolved), intermittent intractable headaches and PPH of social anxiety disorder versus JUDSON, ADHD, unspecified depressive disorder, and learning disability, no prior psychiatric admissions, no prior SA, no  h/o self-injurious behavior,  who  presents in the outpatient office today for psychiatric evaluation and transfer of care appointment in the setting of ongoing ADHD  and social anxiety treatment with recent worsening of anxiety secondary to new life changes/stressors.  Patient reports that since her last office visit with her former provider SARI Carlson she has been compliant with her prescribed medications including Lexapro 20 mg once daily, Intuniv 2 mg nightly, Concerta 54 mg once daily.     Per patient report, the primary complaint and recent symptoms leading up to this evaluation while taking medications as directed include: Worsening anxiety, increased frequency of panic attack, fluctuating motivation (per mother's report), heightened sensory input specifically when in loud environments.  No overt signs or reports of psychosis, bipolar, OCD, eating disorder.  Both the patient and her mother are open to medication management, psychotherapy, and neuropsychological testing moving forward.      On reevaluation on 7/24/2025:  Kathleen Guy reports that overall she has been \"doing pretty well\" since last appointment. Kathleen Guy appears slightly less anxious compared to previous/initial evaluation, but still exhibits certain aspects of anxiety and inattention including moments of distraction and loss of eye contact, bouncing knee during evaluation, fatigue from length of appointment. They endorse partial " response to current psychotropic regimen with significant decrease in frequency of panic attacks denying the occurrence of a major panic attacks since last appointment.  Patient reports some improvement in the anxieties related to her new relationship.  However, patient still believes there is room for further management of her anxiety overall.  At the time of the interview, the patient denies suicidal or homicidal ideation, intent or plan, denies auditory or visual hallucinations or other perceptual disturbances. Psychopharmacologic education provided in addition to supportive therapy.  Risks and benefits of proposed treatment were discussed with the patient at which time they expressed understanding and were amenable to recommendations. The patient is in agreement with the treatment plan as detailed below, and agrees to call the office with any concerns or side effects between appointments.  Patient is also in agreement with initiating therapy with this writer in addition to continuing medication management with this writer.    PLAN:  Continue Concerta 54 mg once daily for ADHD symptoms  Continue Intuniv 2 mg nightly for ADHD symptoms and impulsivity   Continue Lexapro 20 mg once daily for anxiety and related depression like symptoms  Increase BuSpar to 10 mg twice daily for adjunct treatment of anxiety  Initiate therapy with this writer, Genia Andino, DO  Referral for psychological testing placed at last appointment  On further review of patient's media/a bloated documentations it appears that the patient received psychological testing in 2015 and 2021 (2021 assessment results a uploaded to media) which showed diagnosis of JUDSON and ADHD  At this CURRENT time there may be limited benefit to going through further testing for social communication disorder versus ASD especially considering that this writer would very likely continue with similar course of treatment with medication management and therapy.   Will  discuss with patient and mother about whether or not they want to continue with pursuit of further psychological testing at this time or hold off at this time.  Follow-up medication management visit in 6 month

## 2025-07-28 ENCOUNTER — NURSE TRIAGE (OUTPATIENT)
Dept: OTHER | Facility: OTHER | Age: 20
End: 2025-07-28

## 2025-07-29 DIAGNOSIS — F40.10 SOCIAL ANXIETY DISORDER: ICD-10-CM

## 2025-07-29 RX ORDER — BUSPIRONE HYDROCHLORIDE 10 MG/1
5 TABLET ORAL 2 TIMES DAILY
Status: SHIPPED
Start: 2025-07-29 | End: 2025-09-27

## 2025-08-03 DIAGNOSIS — K21.9 GASTROESOPHAGEAL REFLUX DISEASE WITHOUT ESOPHAGITIS: ICD-10-CM

## 2025-08-03 DIAGNOSIS — R10.13 EPIGASTRIC PAIN: ICD-10-CM

## 2025-08-03 DIAGNOSIS — K59.09 CHRONIC CONSTIPATION: ICD-10-CM

## 2025-08-04 RX ORDER — POLYETHYLENE GLYCOL 3350 17 G/17G
510 POWDER, FOR SOLUTION ORAL DAILY
Qty: 15300 G | Refills: 0 | Status: SHIPPED | OUTPATIENT
Start: 2025-08-04

## 2025-08-07 ENCOUNTER — OFFICE VISIT (OUTPATIENT)
Dept: PSYCHIATRY | Facility: CLINIC | Age: 20
End: 2025-08-07

## 2025-08-07 DIAGNOSIS — F40.10 SOCIAL ANXIETY DISORDER: Primary | ICD-10-CM

## 2025-08-07 DIAGNOSIS — Z87.898 HISTORY OF LEARNING DISABILITY: ICD-10-CM

## 2025-08-07 DIAGNOSIS — F90.0 ATTENTION DEFICIT HYPERACTIVITY DISORDER (ADHD), PREDOMINANTLY INATTENTIVE TYPE: ICD-10-CM

## 2025-08-07 PROCEDURE — PBNCHG PB NO CHARGE PLACEHOLDER

## 2025-08-12 ENCOUNTER — TELEPHONE (OUTPATIENT)
Age: 20
End: 2025-08-12

## 2025-08-14 ENCOUNTER — OFFICE VISIT (OUTPATIENT)
Dept: PSYCHIATRY | Facility: CLINIC | Age: 20
End: 2025-08-14

## 2025-08-18 ENCOUNTER — TELEPHONE (OUTPATIENT)
Age: 20
End: 2025-08-18

## 2025-08-21 ENCOUNTER — OFFICE VISIT (OUTPATIENT)
Dept: PSYCHIATRY | Facility: CLINIC | Age: 20
End: 2025-08-21

## 2025-08-21 DIAGNOSIS — F90.0 ATTENTION DEFICIT HYPERACTIVITY DISORDER (ADHD), PREDOMINANTLY INATTENTIVE TYPE: ICD-10-CM

## 2025-08-21 DIAGNOSIS — F40.10 SOCIAL ANXIETY DISORDER: Primary | ICD-10-CM

## 2025-08-21 DIAGNOSIS — Z87.898 HISTORY OF LEARNING DISABILITY: ICD-10-CM

## 2025-08-21 PROCEDURE — PBNCHG PB NO CHARGE PLACEHOLDER

## 2025-08-21 RX ORDER — METHYLPHENIDATE HYDROCHLORIDE 54 MG/1
54 TABLET ORAL DAILY
Qty: 30 TABLET | Refills: 0 | Status: SHIPPED | OUTPATIENT
Start: 2025-08-21 | End: 2025-09-20